# Patient Record
Sex: FEMALE | Race: WHITE | NOT HISPANIC OR LATINO | Employment: OTHER | URBAN - METROPOLITAN AREA
[De-identification: names, ages, dates, MRNs, and addresses within clinical notes are randomized per-mention and may not be internally consistent; named-entity substitution may affect disease eponyms.]

---

## 2017-02-07 RX ORDER — LISINOPRIL 20 MG/1
TABLET ORAL
Qty: 90 TABLET | Refills: 0 | Status: SHIPPED | OUTPATIENT
Start: 2017-02-07 | End: 2017-07-12 | Stop reason: SDUPTHER

## 2017-04-18 RX ORDER — LISINOPRIL 20 MG/1
TABLET ORAL
Qty: 90 TABLET | OUTPATIENT
Start: 2017-04-18

## 2017-07-12 ENCOUNTER — OFFICE VISIT (OUTPATIENT)
Dept: FAMILY MEDICINE CLINIC | Facility: CLINIC | Age: 47
End: 2017-07-12

## 2017-07-12 VITALS
SYSTOLIC BLOOD PRESSURE: 114 MMHG | WEIGHT: 198.4 LBS | DIASTOLIC BLOOD PRESSURE: 80 MMHG | HEIGHT: 63 IN | BODY MASS INDEX: 35.15 KG/M2 | TEMPERATURE: 98.3 F | OXYGEN SATURATION: 95 % | RESPIRATION RATE: 16 BRPM | HEART RATE: 101 BPM

## 2017-07-12 DIAGNOSIS — G43.009 MIGRAINE WITHOUT AURA AND WITHOUT STATUS MIGRAINOSUS, NOT INTRACTABLE: Primary | ICD-10-CM

## 2017-07-12 PROCEDURE — 99214 OFFICE O/P EST MOD 30 MIN: CPT | Performed by: INTERNAL MEDICINE

## 2017-07-12 RX ORDER — TOPIRAMATE 50 MG/1
50 TABLET, FILM COATED ORAL 2 TIMES DAILY
Qty: 30 TABLET | Refills: 5 | Status: SHIPPED | OUTPATIENT
Start: 2017-07-12 | End: 2022-06-22

## 2017-07-12 RX ORDER — SUMATRIPTAN 50 MG/1
TABLET, FILM COATED ORAL
Qty: 18 TABLET | Refills: 5 | Status: SHIPPED | OUTPATIENT
Start: 2017-07-12 | End: 2022-06-22

## 2017-07-12 RX ORDER — LISINOPRIL 20 MG/1
20 TABLET ORAL DAILY
Qty: 90 TABLET | Refills: 3 | Status: SHIPPED | OUTPATIENT
Start: 2017-07-12 | End: 2017-07-17 | Stop reason: SDUPTHER

## 2017-07-12 RX ORDER — ONDANSETRON 4 MG/1
4 TABLET, FILM COATED ORAL EVERY 8 HOURS PRN
Qty: 21 TABLET | Refills: 0 | Status: ON HOLD | OUTPATIENT
Start: 2017-07-12 | End: 2020-06-18

## 2017-07-12 NOTE — PROGRESS NOTES
"Subjective   Patient ID: Silvia Guillory is a 47 y.o. female presents with   Chief Complaint   Patient presents with   • Hypertension     f/u   • Follow-up     migraines       HPI - This patient presents today for follow-up of hypertension.  She is also having migraines.  Has about 2 weeks.  They can get severe enough to cause vomiting.  She's had an intermittent lifelong history of severe migraines.  We talked about potential triggers and avoidances.  She doesn't think she has sleep apnea.  She has had a triptan in the past and that helped with her headaches she like to be prescribed another one again.    Assessment plan    Migraine-we discussed the potential risks and benefits currently the migraines severely impairing her life.  Sumatriptan 50 mg 1 by mouth daily when necessary.  Will also start Topamax 50 mg twice daily.  Zofran when necessary for nausea    Hypertension controlled with lisinopril.        Allergies   Allergen Reactions   • Penicillins        The following portions of the patient's history were reviewed and updated as appropriate: allergies, current medications, past family history, past medical history, past social history, past surgical history and problem list.      Review of Systems   Constitutional: Negative.    Respiratory: Negative.    Cardiovascular: Negative.    Gastrointestinal: Positive for nausea and vomiting.   Neurological: Positive for headaches.   Psychiatric/Behavioral: Negative.         Bipolar disorder under control       Objective     Vitals:    07/12/17 1454   BP: 114/80   Pulse: 101   Resp: 16   Temp: 98.3 °F (36.8 °C)   TempSrc: Oral   SpO2: 95%   Weight: 198 lb 6.4 oz (90 kg)   Height: 63\" (160 cm)         Physical Exam   Constitutional: She is oriented to person, place, and time. She appears well-developed and well-nourished.   HENT:   Head: Normocephalic and atraumatic.   Cardiovascular: Normal rate, regular rhythm and normal heart sounds.    Pulmonary/Chest: Effort normal " and breath sounds normal.   Neurological: She is alert and oriented to person, place, and time.   Psychiatric: She has a normal mood and affect. Her behavior is normal.   Nursing note and vitals reviewed.        Silvia was seen today for hypertension and follow-up.    Diagnoses and all orders for this visit:    Migraine without aura and without status migrainosus, not intractable    Other orders  -     topiramate (TOPAMAX) 50 MG tablet; Take 1 tablet by mouth 2 (Two) Times a Day.  -     SUMAtriptan (IMITREX) 50 MG tablet; Take one tablet at onset of headache. May repeat dose one time in 2 hours if headache not relieved.  -     ondansetron (ZOFRAN) 4 MG tablet; Take 1 tablet by mouth Every 8 (Eight) Hours As Needed for Nausea or Vomiting.  -     lisinopril (PRINIVIL,ZESTRIL) 20 MG tablet; Take 1 tablet by mouth Daily.        Call or return to clinic prn if these symptoms worsen or fail to improve as anticipated.

## 2017-07-17 RX ORDER — LISINOPRIL 20 MG/1
20 TABLET ORAL DAILY
Qty: 90 TABLET | Refills: 3 | Status: SHIPPED | OUTPATIENT
Start: 2017-07-17 | End: 2020-06-17

## 2017-08-23 ENCOUNTER — TELEPHONE (OUTPATIENT)
Dept: FAMILY MEDICINE CLINIC | Facility: CLINIC | Age: 47
End: 2017-08-23

## 2017-08-23 NOTE — TELEPHONE ENCOUNTER
Silvia was at the park on Sunday and thinks she was stung by a sweat bee. She is allergic to bees. She said that the spot is red,swollen, and pus filled. What should she do? Can you send something in or does she need to be seen?

## 2017-08-23 NOTE — TELEPHONE ENCOUNTER
Use hot compresses topical antibiotic like Neosporin and zyrtec if not getting better come see me later on this week or early next week.

## 2017-08-24 ENCOUNTER — TELEPHONE (OUTPATIENT)
Dept: FAMILY MEDICINE CLINIC | Facility: CLINIC | Age: 47
End: 2017-08-24

## 2017-08-24 RX ORDER — EPINEPHRINE 0.3 MG/.3ML
0.3 INJECTION SUBCUTANEOUS ONCE
Qty: 1 EACH | Refills: 0 | Status: SHIPPED | OUTPATIENT
Start: 2017-08-24 | End: 2017-08-24

## 2020-06-17 ENCOUNTER — HOSPITAL ENCOUNTER (EMERGENCY)
Facility: HOSPITAL | Age: 50
Discharge: PSYCHIATRIC OR SUBSTANCE USE FACILITY | End: 2020-06-18
Attending: EMERGENCY MEDICINE

## 2020-06-17 VITALS
HEART RATE: 102 BPM | WEIGHT: 203.7 LBS | SYSTOLIC BLOOD PRESSURE: 145 MMHG | TEMPERATURE: 100.2 F | RESPIRATION RATE: 16 BRPM | BODY MASS INDEX: 36.09 KG/M2 | OXYGEN SATURATION: 99 % | HEIGHT: 63 IN | DIASTOLIC BLOOD PRESSURE: 88 MMHG

## 2020-06-17 DIAGNOSIS — F31.9 BIPOLAR 1 DISORDER (HCC): ICD-10-CM

## 2020-06-17 DIAGNOSIS — F32.A DEPRESSION, UNSPECIFIED DEPRESSION TYPE: ICD-10-CM

## 2020-06-17 DIAGNOSIS — R45.851 SUICIDAL IDEATION: Primary | ICD-10-CM

## 2020-06-17 LAB
AMPHET+METHAMPHET UR QL: NEGATIVE
BARBITURATES UR QL SCN: NEGATIVE
BENZODIAZ UR QL SCN: NEGATIVE
CANNABINOIDS SERPL QL: NEGATIVE
COCAINE UR QL: NEGATIVE
ETHANOL BLD-MCNC: 36 MG/DL (ref 0–10)
ETHANOL UR QL: 0.04 %
HCG SERPL QL: NEGATIVE
HOLD SPECIMEN: NORMAL
METHADONE UR QL SCN: NEGATIVE
OPIATES UR QL: NEGATIVE
OXYCODONE UR QL SCN: NEGATIVE
WHOLE BLOOD HOLD SPECIMEN: NORMAL
WHOLE BLOOD HOLD SPECIMEN: NORMAL

## 2020-06-17 PROCEDURE — 80307 DRUG TEST PRSMV CHEM ANLYZR: CPT | Performed by: EMERGENCY MEDICINE

## 2020-06-17 PROCEDURE — 84703 CHORIONIC GONADOTROPIN ASSAY: CPT | Performed by: EMERGENCY MEDICINE

## 2020-06-17 PROCEDURE — 85025 COMPLETE CBC W/AUTO DIFF WBC: CPT | Performed by: PSYCHIATRY & NEUROLOGY

## 2020-06-17 PROCEDURE — 80053 COMPREHEN METABOLIC PANEL: CPT | Performed by: PSYCHIATRY & NEUROLOGY

## 2020-06-17 PROCEDURE — 80061 LIPID PANEL: CPT | Performed by: PSYCHIATRY & NEUROLOGY

## 2020-06-17 PROCEDURE — 84443 ASSAY THYROID STIM HORMONE: CPT | Performed by: PSYCHIATRY & NEUROLOGY

## 2020-06-17 RX ORDER — LISINOPRIL 10 MG/1
10 TABLET ORAL NIGHTLY
COMMUNITY

## 2020-06-17 RX ORDER — ACETAMINOPHEN, ASPIRIN AND CAFFEINE 250; 250; 65 MG/1; MG/1; MG/1
2 TABLET, FILM COATED ORAL EVERY 6 HOURS PRN
COMMUNITY
End: 2022-06-22

## 2020-06-17 RX ORDER — ROPINIROLE 0.25 MG/1
0.5 TABLET, FILM COATED ORAL NIGHTLY
COMMUNITY

## 2020-06-17 RX ORDER — LURASIDONE HYDROCHLORIDE 40 MG/1
40 TABLET, FILM COATED ORAL EVERY EVENING
Status: ON HOLD | COMMUNITY
End: 2020-06-18

## 2020-06-17 RX ORDER — OMEPRAZOLE 20 MG/1
20 CAPSULE, DELAYED RELEASE ORAL NIGHTLY
COMMUNITY

## 2020-06-17 NOTE — ED NOTES
"Pt stopped taking antidepression meds in March d/t COVID 19 isolation and feeling sick when she took it.  Pt went to [place] for vacation and after returning has felt stronger feelings of depression.  Does not have a plan but is researching methods of suicide, told her  jose armando that she felt that \"she couldn't go on much longer\" and he brought her here. Pt wearing mask, this RN wearing mask and eye cover.     Monica Storey, TINY  06/17/20 1941       Monica Storey, RN  06/17/20 2202    "

## 2020-06-17 NOTE — ED TRIAGE NOTES
Pt arrives complaining of SI. States that she has not been on her psych meds for several months. Reports that she has not attempted to hurt herself today or in the last week. Did not want to say if she has tried to hurt herself beyond that.  Pt masked at arrival and triage staff wore all appropriate PPE.

## 2020-06-17 NOTE — ED PROVIDER NOTES
Subjective   50-year-old female with past medical history of depression, hypertension, previous suicide attempt here for chief complaint of suicidal ideation.  History was obtained from the patient.  The patient states that there is a lot going on because of her kids in her life.  She states that she has had thoughts of hurting self.  She states that her plan is to take pills.  She denies any homicidal ideations.  She denies any hallucinations.  Patient states that she has tried to commit suicide previously.  She states that she also has history of bipolar and her sister is also tried to commit suicide twice.  Patient denies any headaches, neck pain, sore throat, runny nose, cough, cold, chest pain, shortness of breath, abdominal pain, nausea, vomiting, diarrhea, fevers, chills, rashes, urinary symptoms, or any other symptoms. Of note, the patient states that because of COVID she stopped taking her medications and she feels like her medications were not working.          Review of Systems   All other systems reviewed and are negative.      Past Medical History:   Diagnosis Date   • Depression    • Hypertension        Allergies   Allergen Reactions   • Penicillins        Past Surgical History:   Procedure Laterality Date   • ADENOIDECTOMY     •  SECTION     • INNER EAR SURGERY     • TONSILLECTOMY         Family History   Problem Relation Age of Onset   • Heart disease Mother    • Cancer Father         prostate colon    • COPD Father        Social History     Socioeconomic History   • Marital status:      Spouse name: Not on file   • Number of children: Not on file   • Years of education: Not on file   • Highest education level: Not on file   Tobacco Use   • Smoking status: Never Smoker   Substance and Sexual Activity   • Alcohol use: No   • Drug use: No           Objective   Physical Exam   Constitutional: She is oriented to person, place, and time. She appears well-developed and well-nourished. No  distress.   HENT:   Head: Normocephalic and atraumatic.   Right Ear: External ear normal.   Left Ear: External ear normal.   Nose: Nose normal.   Mouth/Throat: Oropharynx is clear and moist. No oropharyngeal exudate.   Eyes: Pupils are equal, round, and reactive to light. Conjunctivae and EOM are normal. Right eye exhibits no discharge. Left eye exhibits no discharge. No scleral icterus.   Neck: Normal range of motion. Neck supple. No tracheal deviation present.   Cardiovascular: Normal rate, regular rhythm, normal heart sounds and intact distal pulses. Exam reveals no gallop and no friction rub.   No murmur heard.  Pulmonary/Chest: Effort normal and breath sounds normal. No stridor. No respiratory distress. She has no wheezes. She has no rales. She exhibits no tenderness.   Abdominal: Soft. Bowel sounds are normal. She exhibits no distension. There is no tenderness. There is no rebound and no guarding.   Musculoskeletal: Normal range of motion. She exhibits no edema, tenderness or deformity.   Neurological: She is alert and oriented to person, place, and time. No sensory deficit. She exhibits normal muscle tone.   Skin: Skin is warm and dry. Capillary refill takes less than 2 seconds. She is not diaphoretic. No erythema. No pallor.   Psychiatric:   Tearful   Vitals reviewed.      Procedures           ED Course  ED Course as of Jul 20 1201 Wed Jun 17, 2020 2000 Care assumed from Dr. Ball pending access evaluation.    [EE]   2214 Discussed with [facility], from [facility].  He has interviewed pt.  Plan for likely admission.      [EE]   2232 [facility] has discussed with Dr. Abdalla.  [facility] will [facility].    [EE]   2232 I have discussed this with the patient.  She is in agreement.    [EE]      ED Course User Index  [EE] Jaime Yost PA                                           MDM  Number of Diagnoses or Management Options     Amount and/or Complexity of Data Reviewed  Clinical lab tests: ordered    Risk  of Complications, Morbidity, and/or Mortality  General comments: When I first saw the patient, the patient appeared nontoxic.  Patient was stable.  Patient had no medical complaints.  Patient was medically cleared.  Patient is here for suicidal ideations.  We did get a beta-hCG, ethanol level, and UDS.  This is currently pending.  We did consult access given that patient is suicidal and has a plan.  Additionally, she does have history of bipolar and has attempted to commit suicide previously.  Additionally, patient is also not taking her medications like she supposed to.  Patient was placed on a 72-hour hold.  At this point, I defer all further management of this patient to the psych team and further management of the patient and disposition depends on the psych consultation.        Final diagnoses:   Suicidal ideation   Bipolar 1 disorder (CMS/[facility])   Depression, unspecified depression type            Clint Ball MD  06/17/20 1951       Clint Ball MD  07/20/20 1201

## 2020-06-18 ENCOUNTER — HOSPITAL ENCOUNTER (INPATIENT)
Facility: HOSPITAL | Age: 50
LOS: 4 days | Discharge: HOME | End: 2020-06-22
Attending: SPECIALIST | Admitting: SPECIALIST

## 2020-06-18 DIAGNOSIS — R06.83 SNORING: Primary | ICD-10-CM

## 2020-06-18 DIAGNOSIS — G25.81 RESTLESS LEG: ICD-10-CM

## 2020-06-18 PROBLEM — E87.20 METABOLIC ACIDOSIS: Status: ACTIVE | Noted: 2020-06-18

## 2020-06-18 PROBLEM — R63.5 WEIGHT GAIN: Status: ACTIVE | Noted: 2020-06-18

## 2020-06-18 PROBLEM — R79.89 ABNORMAL TSH: Status: ACTIVE | Noted: 2020-06-18

## 2020-06-18 PROBLEM — I10 HYPERTENSION: Status: ACTIVE | Noted: 2020-06-18

## 2020-06-18 PROBLEM — F33.2 MAJOR DEPRESSIVE DISORDER, RECURRENT SEVERE WITHOUT PSYCHOTIC FEATURES: Status: ACTIVE | Noted: 2020-06-18

## 2020-06-18 PROBLEM — G43.909 MIGRAINE: Status: ACTIVE | Noted: 2020-06-18

## 2020-06-18 LAB
ALBUMIN SERPL-MCNC: 3.9 G/DL (ref 3.5–5.2)
ALBUMIN/GLOB SERPL: 1.3 G/DL
ALP SERPL-CCNC: 74 U/L (ref 39–117)
ALT SERPL W P-5'-P-CCNC: 25 U/L (ref 1–33)
ANION GAP SERPL CALCULATED.3IONS-SCNC: 10.9 MMOL/L (ref 5–15)
ANION GAP SERPL CALCULATED.3IONS-SCNC: 15.5 MMOL/L (ref 5–15)
AST SERPL-CCNC: 20 U/L (ref 1–32)
BASOPHILS # BLD AUTO: 0.04 10*3/MM3 (ref 0–0.2)
BASOPHILS NFR BLD AUTO: 0.4 % (ref 0–1.5)
BILIRUB SERPL-MCNC: 0.2 MG/DL (ref 0.2–1.2)
BUN BLD-MCNC: 12 MG/DL (ref 6–20)
BUN BLD-MCNC: 12 MG/DL (ref 6–20)
BUN/CREAT SERPL: 13.8 (ref 7–25)
BUN/CREAT SERPL: 15.2 (ref 7–25)
CALCIUM SPEC-SCNC: 9.1 MG/DL (ref 8.6–10.5)
CALCIUM SPEC-SCNC: 9.3 MG/DL (ref 8.6–10.5)
CHLORIDE SERPL-SCNC: 108 MMOL/L (ref 98–107)
CHLORIDE SERPL-SCNC: 110 MMOL/L (ref 98–107)
CHOLEST SERPL-MCNC: 168 MG/DL (ref 0–200)
CO2 SERPL-SCNC: 14.5 MMOL/L (ref 22–29)
CO2 SERPL-SCNC: 20.1 MMOL/L (ref 22–29)
CREAT BLD-MCNC: 0.79 MG/DL (ref 0.57–1)
CREAT BLD-MCNC: 0.87 MG/DL (ref 0.57–1)
DEPRECATED RDW RBC AUTO: 46.5 FL (ref 37–54)
EOSINOPHIL # BLD AUTO: 0.11 10*3/MM3 (ref 0–0.4)
EOSINOPHIL NFR BLD AUTO: 1.2 % (ref 0.3–6.2)
ERYTHROCYTE [DISTWIDTH] IN BLOOD BY AUTOMATED COUNT: 13.7 % (ref 12.3–15.4)
GFR SERPL CREATININE-BSD FRML MDRD: 69 ML/MIN/1.73
GFR SERPL CREATININE-BSD FRML MDRD: 78 ML/MIN/1.73
GLOBULIN UR ELPH-MCNC: 3.1 GM/DL
GLUCOSE BLD-MCNC: 114 MG/DL (ref 65–99)
GLUCOSE BLD-MCNC: 141 MG/DL (ref 65–99)
HCT VFR BLD AUTO: 36.9 % (ref 34–46.6)
HDLC SERPL-MCNC: 46 MG/DL (ref 40–60)
HGB BLD-MCNC: 12.1 G/DL (ref 12–15.9)
IMM GRANULOCYTES # BLD AUTO: 0.06 10*3/MM3 (ref 0–0.05)
IMM GRANULOCYTES NFR BLD AUTO: 0.7 % (ref 0–0.5)
LDLC SERPL CALC-MCNC: 90 MG/DL (ref 0–100)
LDLC/HDLC SERPL: 1.95 {RATIO}
LYMPHOCYTES # BLD AUTO: 2.06 10*3/MM3 (ref 0.7–3.1)
LYMPHOCYTES NFR BLD AUTO: 22.5 % (ref 19.6–45.3)
MCH RBC QN AUTO: 29.9 PG (ref 26.6–33)
MCHC RBC AUTO-ENTMCNC: 32.8 G/DL (ref 31.5–35.7)
MCV RBC AUTO: 91.1 FL (ref 79–97)
MONOCYTES # BLD AUTO: 0.58 10*3/MM3 (ref 0.1–0.9)
MONOCYTES NFR BLD AUTO: 6.3 % (ref 5–12)
NEUTROPHILS # BLD AUTO: 6.32 10*3/MM3 (ref 1.7–7)
NEUTROPHILS NFR BLD AUTO: 68.9 % (ref 42.7–76)
NRBC BLD AUTO-RTO: 0 /100 WBC (ref 0–0.2)
PLATELET # BLD AUTO: 254 10*3/MM3 (ref 140–450)
PMV BLD AUTO: 10.6 FL (ref 6–12)
POTASSIUM BLD-SCNC: 3.8 MMOL/L (ref 3.5–5.2)
POTASSIUM BLD-SCNC: 4.4 MMOL/L (ref 3.5–5.2)
PROT SERPL-MCNC: 7 G/DL (ref 6–8.5)
RBC # BLD AUTO: 4.05 10*6/MM3 (ref 3.77–5.28)
SODIUM BLD-SCNC: 138 MMOL/L (ref 136–145)
SODIUM BLD-SCNC: 141 MMOL/L (ref 136–145)
T4 FREE SERPL-MCNC: 1.2 NG/DL (ref 0.93–1.7)
TRIGL SERPL-MCNC: 161 MG/DL (ref 0–150)
TSH SERPL DL<=0.05 MIU/L-ACNC: 6.19 UIU/ML (ref 0.27–4.2)
TSH SERPL DL<=0.05 MIU/L-ACNC: 9.2 UIU/ML (ref 0.27–4.2)
VLDLC SERPL-MCNC: 32.2 MG/DL (ref 5–40)
WBC NRBC COR # BLD: 9.17 10*3/MM3 (ref 3.4–10.8)

## 2020-06-18 PROCEDURE — 84443 ASSAY THYROID STIM HORMONE: CPT | Performed by: NURSE PRACTITIONER

## 2020-06-18 PROCEDURE — 84439 ASSAY OF FREE THYROXINE: CPT | Performed by: NURSE PRACTITIONER

## 2020-06-18 PROCEDURE — 80048 BASIC METABOLIC PNL TOTAL CA: CPT | Performed by: NURSE PRACTITIONER

## 2020-06-18 RX ORDER — SUMATRIPTAN 50 MG/1
50 TABLET, FILM COATED ORAL
Status: DISCONTINUED | OUTPATIENT
Start: 2020-06-18 | End: 2020-06-22 | Stop reason: HOSPADM

## 2020-06-18 RX ORDER — PANTOPRAZOLE SODIUM 40 MG/1
40 TABLET, DELAYED RELEASE ORAL NIGHTLY
Status: DISCONTINUED | OUTPATIENT
Start: 2020-06-18 | End: 2020-06-20

## 2020-06-18 RX ORDER — ROPINIROLE 0.5 MG/1
0.5 TABLET, FILM COATED ORAL NIGHTLY
Status: DISCONTINUED | OUTPATIENT
Start: 2020-06-18 | End: 2020-06-22 | Stop reason: HOSPADM

## 2020-06-18 RX ORDER — LURASIDONE HYDROCHLORIDE 20 MG/1
20 TABLET, FILM COATED ORAL NIGHTLY
Status: DISCONTINUED | OUTPATIENT
Start: 2020-06-18 | End: 2020-06-18

## 2020-06-18 RX ORDER — ACETAMINOPHEN 325 MG/1
650 TABLET ORAL EVERY 4 HOURS PRN
Status: DISCONTINUED | OUTPATIENT
Start: 2020-06-18 | End: 2020-06-22 | Stop reason: HOSPADM

## 2020-06-18 RX ORDER — LOPERAMIDE HYDROCHLORIDE 2 MG/1
2 CAPSULE ORAL
Status: DISCONTINUED | OUTPATIENT
Start: 2020-06-18 | End: 2020-06-22 | Stop reason: HOSPADM

## 2020-06-18 RX ORDER — TOPIRAMATE 50 MG/1
50 TABLET, FILM COATED ORAL 2 TIMES DAILY
Status: DISCONTINUED | OUTPATIENT
Start: 2020-06-18 | End: 2020-06-22 | Stop reason: HOSPADM

## 2020-06-18 RX ORDER — LISINOPRIL 10 MG/1
10 TABLET ORAL NIGHTLY
Status: DISCONTINUED | OUTPATIENT
Start: 2020-06-18 | End: 2020-06-22 | Stop reason: HOSPADM

## 2020-06-18 RX ORDER — ACETAMINOPHEN, ASPIRIN AND CAFFEINE 250; 250; 65 MG/1; MG/1; MG/1
2 TABLET, FILM COATED ORAL EVERY 12 HOURS PRN
Status: DISCONTINUED | OUTPATIENT
Start: 2020-06-18 | End: 2020-06-22 | Stop reason: HOSPADM

## 2020-06-18 RX ORDER — CLONAZEPAM 0.5 MG/1
0.5 TABLET ORAL 2 TIMES DAILY PRN
Status: DISCONTINUED | OUTPATIENT
Start: 2020-06-18 | End: 2020-06-22 | Stop reason: HOSPADM

## 2020-06-18 RX ORDER — LURASIDONE HYDROCHLORIDE 40 MG/1
40 TABLET, FILM COATED ORAL NIGHTLY
Status: DISCONTINUED | OUTPATIENT
Start: 2020-06-18 | End: 2020-06-18

## 2020-06-18 RX ORDER — ALUMINA, MAGNESIA, AND SIMETHICONE 2400; 2400; 240 MG/30ML; MG/30ML; MG/30ML
15 SUSPENSION ORAL EVERY 6 HOURS PRN
Status: DISCONTINUED | OUTPATIENT
Start: 2020-06-18 | End: 2020-06-22 | Stop reason: HOSPADM

## 2020-06-18 RX ORDER — LURASIDONE HYDROCHLORIDE 40 MG/1
20 TABLET, FILM COATED ORAL DAILY
Status: DISCONTINUED | OUTPATIENT
Start: 2020-06-18 | End: 2020-06-22 | Stop reason: HOSPADM

## 2020-06-18 RX ORDER — DESVENLAFAXINE SUCCINATE 50 MG/1
50 TABLET, EXTENDED RELEASE ORAL DAILY
Status: DISCONTINUED | OUTPATIENT
Start: 2020-06-18 | End: 2020-06-22 | Stop reason: HOSPADM

## 2020-06-18 RX ORDER — DESVENLAFAXINE 25 MG/1
25 TABLET, EXTENDED RELEASE ORAL DAILY
Status: DISCONTINUED | OUTPATIENT
Start: 2020-06-18 | End: 2020-06-18

## 2020-06-18 RX ADMIN — TOPIRAMATE 50 MG: 50 TABLET, FILM COATED ORAL at 20:58

## 2020-06-18 RX ADMIN — ROPINIROLE HYDROCHLORIDE 0.5 MG: 0.5 TABLET, FILM COATED ORAL at 01:29

## 2020-06-18 RX ADMIN — PANTOPRAZOLE SODIUM 40 MG: 40 TABLET, DELAYED RELEASE ORAL at 01:30

## 2020-06-18 RX ADMIN — TOPIRAMATE 50 MG: 50 TABLET, FILM COATED ORAL at 10:19

## 2020-06-18 RX ADMIN — LURASIDONE HYDROCHLORIDE 20 MG: 20 TABLET, FILM COATED ORAL at 19:29

## 2020-06-18 RX ADMIN — DESVENLAFAXINE SUCCINATE 50 MG: 50 TABLET, EXTENDED RELEASE ORAL at 16:08

## 2020-06-18 RX ADMIN — CLONAZEPAM 0.5 MG: 0.5 TABLET ORAL at 08:53

## 2020-06-18 RX ADMIN — LISINOPRIL 10 MG: 10 TABLET ORAL at 20:58

## 2020-06-18 RX ADMIN — LURASIDONE HYDROCHLORIDE 40 MG: 40 TABLET, FILM COATED ORAL at 01:28

## 2020-06-18 RX ADMIN — TOPIRAMATE 50 MG: 50 TABLET, FILM COATED ORAL at 01:31

## 2020-06-18 RX ADMIN — CLONAZEPAM 0.5 MG: 0.5 TABLET ORAL at 21:04

## 2020-06-18 RX ADMIN — ROPINIROLE HYDROCHLORIDE 0.5 MG: 0.5 TABLET, FILM COATED ORAL at 20:58

## 2020-06-18 RX ADMIN — LISINOPRIL 10 MG: 10 TABLET ORAL at 01:28

## 2020-06-18 RX ADMIN — PANTOPRAZOLE SODIUM 40 MG: 40 TABLET, DELAYED RELEASE ORAL at 20:58

## 2020-06-18 NOTE — PROGRESS NOTES
Continued Stay Note  Psychiatric     Patient Name: Silvia Guillory  MRN: [id number]  Today's Date: 6/18/2020    Admit Date: 6/18/2020    Discharge Plan     Row Name 06/18/20 1711       Plan    Plan Comments  SW called and spoke w/pt's spouse, Seymour Guillory, ph. [phone] to discuss pt's tx & d/c planning.  SW explained role of CCP related to d/c planning.  Pt's spouse stated that he has been able to speak w/her several times and just wants her to get better.  SW adv that she would keep him updated on pt's d/c plans.    Row Name 06/18/20 1703       Plan    Plan   Pt will return home.  Pt will participate in therapeutic groups/activities on the unit.  SW will explore outpt providers for continuity of care.    Plan Comments  SW met w/pt to discuss tx & d/c planning.  Pt was able to verify demographic info as well as PCP, Daniel Monaco.   SW verified emergency contacts & confirmed that her spouse could be contacted.  SW inquired about outpt mental health providers; pt stated that she sees Dr. Calderon Bañuelos in [place], [place].  Pt stated that she use to see Reyna Benoit at [facility] and would like to resume svcs w/her.        Discharge Codes    No documentation.             DOMINGUEZ Hurst

## 2020-06-18 NOTE — CONSULTS
Consultation     Patient Name: Silvia Guillory  Age/Sex: 50 y.o. female  : 1970  MRN: [id number]    Date of Admission: 2020  Date of Encounter Visit: 20  Encounter Provider: RACHAEL Blakely  Place of Service: [facility]  Patient Care Team:  Saroj Frederick MD as PCP - General (Internal Medicine)    Subjective:     Consults  Reason for consultation: Medical evaluation contributing to current psychiatric illness.    Chief Complaint:   depression    History of Present Illness  Silvia Guillory is a 50 y.o. female who was admitted to the Crisis Management Unit for further evaluation regarding worsening depression.  We were asked to see and evaluate her medical issues as they may pertain to her current psychiatric illness. Patient has a history of depression and HTN. Denies any recent illnesses, chest pain, palpitations, dizziness, N/V/D, edema or shortness of breath.  She reports a chronic history of depression and has been on medications for well over 15 years.  In February she decided to stop taking her medication because she felt it was not helping and seemed to make her symptoms worse.      She has been having persistent issues with sleeping and has been told that she snores pretty loudly.  She has chronic fatigue and often crashes and then has intermittent awakenings throughout the night.  Denies any prior evaluation for sleep apnea.  Over the past year she has had about a 30 to 50 pound weight gain despite adjustments in diet.  Recently she started the keto diet and has been able to lose some weight, but is having increased issues with constipation.  Denies any personal or family history of thyroid disease.  She reports increased family stressors especially with her grown children.  Recently she was interested in suicide methods and was encouraged by her psychiatrist to consider inpatient treatment with medication adjustment.  She has a history of migraines and has been on  Topamax for quite some time.     Labs showed CO2 14.5 with anion gap of 15.5, chloride 108, TSH 9.2 and triglycerides 161.  Urine tox screen was negative, but was positive for ethanol.     Review of Systems   Constitutional: Positive for fatigue. Negative for chills and fever.   HENT: Negative for congestion and trouble swallowing.    Eyes: Negative for visual disturbance.   Respiratory: Negative for cough, shortness of breath and wheezing.    Cardiovascular: Negative for chest pain, palpitations and leg swelling.   Gastrointestinal: Positive for constipation. Negative for abdominal pain, diarrhea, nausea and vomiting.   Endocrine: Negative for cold intolerance, heat intolerance, polydipsia and polyuria.   Genitourinary: Negative for difficulty urinating and dysuria.   Musculoskeletal: Negative for back pain.   Neurological: Positive for headaches. Negative for dizziness, syncope and weakness.   Psychiatric/Behavioral: Positive for dysphoric mood and sleep disturbance. Negative for confusion. The patient is not nervous/anxious.    All other systems reviewed and are negative.      Past Medical and Surgical History:  Past Medical History:   Diagnosis Date   • Depression    • Hypertension        Past Surgical History:   Procedure Laterality Date   • ADENOIDECTOMY     •  SECTION     • INNER EAR SURGERY     • TONSILLECTOMY         Home Medications:   Medications Prior to Admission   Medication Sig Dispense Refill Last Dose   • aspirin-acetaminophen-caffeine (EXCEDRIN MIGRAINE) 250-250-65 MG per tablet Take 2 tablets by mouth Every 6 (Six) Hours As Needed for Headache.   Past Month at Unknown time   • clonazePAM (KlonoPIN) 0.5 MG tablet Take 1 tablet by mouth 2 (two) times a day as needed.   2020 at Unknown time   • lisinopril (PRINIVIL,ZESTRIL) 10 MG tablet Take 10 mg by mouth Every Night.   2020 at Unknown time   • omeprazole (priLOSEC) 20 MG capsule Take 20 mg by mouth Every Night.   2020 at  Unknown time   • rOPINIRole (REQUIP) 0.25 MG tablet Take 0.5 mg by mouth Every Night. Take 1 hour before bedtime, take 2 tabs nightly   6/16/2020 at Unknown time   • SUMAtriptan (IMITREX) 50 MG tablet Take one tablet at onset of headache. May repeat dose one time in 2 hours if headache not relieved. 18 tablet 5 Past Month at Unknown time   • topiramate (TOPAMAX) 50 MG tablet Take 1 tablet by mouth 2 (Two) Times a Day. (Patient taking differently: Take 50 mg by mouth 2 (Two) Times a Day. Takes 1-2 tabs ( mg) nightly, for migraines) 30 tablet 5 6/16/2020 at Unknown time   • buPROPion XL (WELLBUTRIN XL) 300 MG 24 hr tablet Take 300 mg by mouth Daily.   Unknown at Unknown time       Inpatient Medications:  Scheduled Meds:  Desvenlafaxine Succinate ER 50 mg Oral Daily   lisinopril 10 mg Oral Nightly   Lurasidone HCl 20 mg Oral Daily   pantoprazole 40 mg Oral Nightly   rOPINIRole 0.5 mg Oral Nightly   topiramate 50 mg Oral BID     Continuous Infusions:   PRN Meds:.•  acetaminophen  •  aluminum-magnesium hydroxide-simethicone  •  aspirin-acetaminophen-caffeine  •  clonazePAM  •  loperamide  •  magnesium hydroxide  •  SUMAtriptan    Allergies:  Allergies   Allergen Reactions   • Penicillins    • Iodides Hives       Past Social History:  Social History     Socioeconomic History   • Marital status:      Spouse name: Not on file   • Number of children: Not on file   • Years of education: Not on file   • Highest education level: Not on file   Tobacco Use   • Smoking status: Never Smoker   Substance and Sexual Activity   • Alcohol use: No   • Drug use: No       Past Family History:  Family History   Problem Relation Age of Onset   • Heart disease Mother    • Cancer Father         prostate colon    • COPD Father        Objective:   Temp:  [97.5 °F (36.4 °C)-100.2 °F (37.9 °C)] 98 °F (36.7 °C)  Heart Rate:  [] 86  Resp:  [16-18] 16  BP: (121-145)/(80-88) 121/80   SpO2:  [98 %-100 %] 98 %  on    Device (Oxygen  Therapy): room air    Intake/Output Summary (Last 24 hours) at 6/18/2020 1639  Last data filed at 6/18/2020 0833  Gross per 24 hour   Intake 720 ml   Output --   Net 720 ml     There is no height or weight on file to calculate BMI.  There were no vitals filed for this visit.  Weight change:   Ventilator/Non-Invasive Ventilation Settings (From admission, onward)    None          Physical Exam   Constitutional: She is oriented to person, place, and time. She appears well-developed and well-nourished. No distress.   HENT:   Head: Normocephalic and atraumatic.   Eyes: Pupils are equal, round, and reactive to light. Conjunctivae are normal. No scleral icterus.   Neck: Neck supple. No tracheal deviation present. No thyromegaly present.   Cardiovascular: Normal rate, regular rhythm and normal heart sounds.   No murmur heard.  Pulmonary/Chest: Effort normal and breath sounds normal. She has no wheezes. She has no rales.   Abdominal: Soft. Bowel sounds are normal. She exhibits no distension. There is no tenderness.   Truncal obestiy   Musculoskeletal: She exhibits no edema.   Neurological: She is alert and oriented to person, place, and time.   Skin: Skin is warm and dry. She is not diaphoretic.   Psychiatric:   Flat affect   Nursing note and vitals reviewed.        Lab Review:     Results from last 7 days   Lab Units 06/17/20 1947   SODIUM mmol/L 138   POTASSIUM mmol/L 3.8   CHLORIDE mmol/L 108*   CO2 mmol/L 14.5*   BUN mg/dL 12   CREATININE mg/dL 0.79   EGFR IF NONAFRICN AM mL/min/1.73 78   GLUCOSE mg/dL 141*   CALCIUM mg/dL 9.3   AST (SGOT) U/L 20   ALT (SGPT) U/L 25     Estimated Creatinine Clearance: 94 mL/min (by C-G formula based on SCr of 0.79 mg/dL).          Results from last 7 days   Lab Units 06/17/20 1947   WBC 10*3/mm3 9.17   HEMOGLOBIN g/dL 12.1   HEMATOCRIT % 36.9   PLATELETS 10*3/mm3 254   MCV fL 91.1   MCH pg 29.9   MCHC g/dL 32.8   RDW % 13.7   RDW-SD fl 46.5   MPV fL 10.6   NEUTROPHIL % % 68.9    LYMPHOCYTE % % 22.5   MONOCYTES % % 6.3   EOSINOPHIL % % 1.2   BASOPHIL % % 0.4   IMM GRAN % % 0.7*   NEUTROS ABS 10*3/mm3 6.32   LYMPHS ABS 10*3/mm3 2.06   MONOS ABS 10*3/mm3 0.58   EOS ABS 10*3/mm3 0.11   BASOS ABS 10*3/mm3 0.04   IMMATURE GRANS (ABS) 10*3/mm3 0.06*   NRBC /100 WBC 0.0             Results from last 7 days   Lab Units 06/17/20 1947   CHOLESTEROL mg/dL 168   TRIGLYCERIDES mg/dL 161*   HDL CHOL mg/dL 46         Results from last 7 days   Lab Units 06/17/20 1947   TSH uIU/mL 9.200*                                       Imaging:  Imaging Results (Last 24 Hours)     ** No results found for the last 24 hours. **          EKG:  No orders to display       I reviewed the patient's new clinical results, lab tests and medications.     Problem List:     Active Hospital Problems    Diagnosis  POA   • Major depressive disorder, recurrent severe without psychotic features (CMS/HCC) [F33.2]  Yes   • Snoring [R06.83]  Unknown   • Abnormal TSH [R79.89]  Unknown   • Weight gain [R63.5]  Unknown   • Metabolic acidosis [E87.2]  Unknown   • Hypertension [I10]  Unknown   • Restless leg [G25.81]  Unknown   • Migraine [G43.909]  Unknown      Resolved Hospital Problems   No resolved problems to display.       Assessment and Plan:     ·   Major depressive disorder, recurrent severe without psychotic features (CMS/HCC)- defer management to psychiatry. With abnormal TSH there could be some component of hypothyroid that could be contributing as well as possible untreated sleep apnea affecting overall sleep.   ·   Snoring- recommend outpatient evaluation for sleep apnea   ·   Abnormal TSH- TSH 9 with prior normal level. She does have symptoms with fatigue, depression and constipation. Will check free T4 and if abnormal could consider trial of levothyroxine. If Free T4 is normal then may be from sick thyroid and would hold on treatment. Regardless will need repeat testing in 6 weeks with PCP.   ·   Weight gain- likely  multifactorial. Thyroid abnormality and sleep apnea may contribute. Continue diet modifications and exercise.   ·   Metabolic acidosis- possible from dehydration and keto diet. Repeat BMP and encouraged oral fluids.   ·   Hypertension- BP stable. Continue lisinopril  ·   Restless leg- chronic. Could be worsened by untreated sleep apnea. Continue requip. Sleep apnea eval as per above.   ·   Migraine- chronic. No acute exacerbation. Continue topamax and PRN imitrex      The patient seems medically stable at this time. If labs are stable will plan to sign off. Otherwise there are no medical issues which need to be addressed while she is under psychiatric care and may continue to follow up with her PCP as an outpatient and obtain sleep apnea evaluation.     RACHAEL Blakely  [facility]  06/18/20  4:39 PM    Dictated utilizing Dragon dictation

## 2020-06-18 NOTE — PLAN OF CARE
Problem: Patient Care Overview  Goal: Plan of Care Review  Outcome: Ongoing (interventions implemented as appropriate)  Goal: Individualization and Mutuality  Outcome: Ongoing (interventions implemented as appropriate)  Goal: Discharge Needs Assessment  Outcome: Ongoing (interventions implemented as appropriate)  Goal: Interprofessional Rounds/Family Conf  Outcome: Ongoing (interventions implemented as appropriate)     Problem: Overarching Goals (Adult)  Goal: Adheres to Safety Considerations for Self and Others  Outcome: Ongoing (interventions implemented as appropriate)  Goal: Optimized Coping Skills in Response to Life Stressors  Outcome: Ongoing (interventions implemented as appropriate)  Goal: Develops/Participates in Therapeutic [place] to Support Successful Transition  Outcome: Ongoing (interventions implemented as appropriate)     Problem: Suicidal Behavior (Adult)  Goal: Suicidal Behavior is Absent/Minimized/Managed  Outcome: Ongoing (interventions implemented as appropriate)  Flowsheets (Taken 6/17/2020 4363)  Suicidal Behavior Managed/Minimized Time Frame for Action Step (STG): 4 days  Suicidal Behavior Managed/Minimized Action Step (STG) Outcome: making progress toward outcome
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
## 2020-06-18 NOTE — PLAN OF CARE
Problem: Patient Care Overview  Goal: Individualization and Mutuality  Outcome: Ongoing (interventions implemented as appropriate)  Flowsheets (Taken 6/18/2020 1019)  Patient Personal Strengths: stable living environment; family/social support     Problem: Patient Care Overview  Goal: Interprofessional Rounds/Family Conf  Outcome: Ongoing (interventions implemented as appropriate)  Flowsheets (Taken 6/18/2020 1019)  Participants: art therapy; ; pharmacy; psychiatrist; social work; nursing  Summary: Treatment team met to discuss pt's plan of care.  Pt will participate in therapeutic groups/activities on the unit.  SW will explore outpt providers.  Progress & svcs needed will be assessed ongoing.     Problem: Suicidal Behavior (Adult)  Goal: Suicidal Behavior is Absent/Minimized/Managed  Outcome: Ongoing (interventions implemented as appropriate)  Flowsheets  Taken 6/18/2020 1019 by Lucinda Wolfe CSW  Suicidal Behavior Managed/Minimized Time Frame for Action Step (STG): 4 days  Taken 6/18/2020 0442 by Daisha Patterson RN  Suicidal Behavior Managed/Minimized Action Step (STG) Outcome: making progress toward outcome     Patient/Guardian Signature: __________________________________            Psychiatrist Signature: ______________________________________             Therapist Signature: ________________________________________         Nurse Signature: ___________________________________________          Staff Signature: ____________________________________________            Staff Signature: ____________________________________________          Staff Signature: ____________________________________________          Staff Signature:

## 2020-06-18 NOTE — CONSULTS
"Reports she has been seeing Dr. Calderon Bañuelos since June, 2015, and continued to see him in [place] when he moved his practice there. Educated about process.     Confirms that her  called [facility] 3 times to day with f/u questions about a likely admit (after, per day shift report) Dr. Bañuelos called [facility] and advised us that pt would be coming and did, in his professional opinion, meet criteria for an inpatient psych admit at that time.     Pt reports she was BIB , Seymour Guillory, [phone], provides verbal consent to share PHI with ; reports  still here in car.     Asked what her recent stressors have been, \"life, my kids\", vague, \"they just break your heart\", \"they first of all, they both got  [March, 2019] ... We're Jehovah witnesses ... Both of them have been ex-communicated ... The one has recently come back, but within a week of him coming back the other was excommunicated, he's gone off the deep end, he went and got a motorcycle, he needs to be medicated ... My boys need to be medicated ... And they refuse it\". Thinks youngest is bipolar, and \"it just scares me right now with him being gone ... He just doesn't care any more ... He just doesn't care period about anything\". Reports that \"I'm scared to death\" that younger son would hurt himself, the one who is now excommunicated and just turned 22. Accepts characterization of conflicting loyalties to her huber and to her younger son. Also reports that she had to put down in November, after 11 years. Tearful when discussing this, stating that Remus (dog) \"was always there for me\".     Confirms that she was researching suicide methods on the Internet, but states that was a few days ago. Reports that 's mom had to go into NH and isn't doing well,  also taking care of dad. States this is leading her to have guilt about her own crisis.     Asked what lead her to come in today, reports that she only told  " "about researching methods of suicide today, leading him to insist that she get help.     Reports last suicide attempt was by \"taking a few more pills than I should a few weeks ago, but seriously [suicide attempt was] maybe a year or 2\" ago, and first suicide attempt was after mom  in . Confirms 2 suicide attempts in lifetime on f/u, and that \"if someone hadn't come in, I would have succeeded both times\". Reports sister attempted suicide X 2, MRE 25 years ago.     Reports she is aware of effect her suicide would have on her family, \"I am, but I think they would all be fine, she's the only one that I really worry about\" - indicating the degree of thought she has recently put into suicide.     Reports therapist was last seen about 1-1 1/2 years ago, Reyna Benoit.     Reports 1-2 drinks a few times a month, reports she had a couple beers tonight. Denies illicit drug use.     Reports waking several times a night.     Educated about medications, voices reluctance to take any meds, especially any meds with a side effect of weight gain, reporting she has recently gained weight anyway.     Reports hx HTN, GERD, RLS, migraines, anxiety, bipolar, depression.     I called , who confirms pt's narrative, and reports that pt was \"she was so mad at me, because I wouldn't let her do what she needed to do to kill herself\".     Dr. Abdalla ordered inpatient admission to [facility], PA Elder agrees with plan.     Pt verbally shawn for safety while in hospital, and states she will tell staff prior to harm behaviors if unable to continue shawn for safety in the future. Appears calmer after eval, and sincere.     Dr. Abdalla ordered no 1:1 sitter if on [place] [facility], would require a 1:1 sitter if admitted or moved to [place] [facility].                             "

## 2020-06-18 NOTE — PLAN OF CARE
Problem: Patient Care Overview  Goal: Plan of Care Review  Outcome: Ongoing (interventions implemented as appropriate)  Flowsheets (Taken 6/18/2020 4752)  Progress: improving  Plan of Care Reviewed With: patient  Patient Agreement with Plan of Care: agrees  Note:   Pt arrived on the unit at around 0018, escorted by security. Pt was seeing in the emergency room for SI. Pt has a history of major depression , she stopped taking her medications because she was tired of it. Pt denied SI, HI, and hallucinations.pt voiced anxiety of 9/101, and depression of 9/10. On skin assessment, pat's skin looks good. Pt vital signs was within the normal level. Pt compliant with medications and care. Pt slept the rest of the night. Will continue to monitor behaviors, provide support and safe environment.

## 2020-06-18 NOTE — PLAN OF CARE
Problem: Patient Care Overview  Goal: Discharge Needs Assessment  Outcome: Ongoing (interventions implemented as appropriate)  Flowsheets  Taken 6/18/2020 1701  Concerns Comments: Pt will return home.  Pt will participate in therapeutic groups/activities on the unit.  SW will explore outpt providers for continuity of care.  Taken 6/18/2020 1708  Transportation Concerns: car, none  Concerns to be Addressed: coping/stress;decision making;discharge planning;mental health;suicidal  Patient/Family Anticipated Services at Transition: mental health services  Patient/Family Anticipates Transition to: home with family

## 2020-06-18 NOTE — PROGRESS NOTES
Continued Stay Note  [facility]     Patient Name: Silvia Guillory  MRN: [id number]  Today's Date: 6/18/2020    Admit Date: 6/18/2020    Discharge Plan     Row Name 06/18/20 1703       Plan    Plan   Pt will return home.  Pt will participate in therapeutic groups/activities on the unit.  SW will explore outpt providers for continuity of care.    Plan Comments  SW met w/pt to discuss tx & d/c planning.  Pt was able to verify demographic info as well as PCP, Daniel Monaco.   SW verified emergency contacts & confirmed that her spouse could be contacted.  SW inquired about outpt mental health providers; pt stated that she sees Dr. Calderon Bañuelos in [place], [place].  Pt stated that she use to see Reyna Benoit at [facility] and would like to resume svcs w/her.        Discharge Codes    No documentation.             DOMINGUEZ Hurst

## 2020-06-18 NOTE — PLAN OF CARE
Pt calm and cooperative. Mood is flat and anxious, c/o depression and anxiety 10/10. She wants to start a new medication regime as she does not feel her prior prescriptions were working well for her. She denies SI. She has been active in milieu and attending groups.     Problem: Patient Care Overview  Goal: Plan of Care Review  Outcome: Ongoing (interventions implemented as appropriate)  Flowsheets  Taken 6/18/2020 1622  Progress: improving  Taken 6/18/2020 0853  Plan of Care Reviewed With: patient  Patient Agreement with Plan of Care: agrees  Goal: Individualization and Mutuality  Outcome: Ongoing (interventions implemented as appropriate)  Goal: Discharge Needs Assessment  Outcome: Ongoing (interventions implemented as appropriate)  Goal: Interprofessional Rounds/Family Conf  Outcome: Ongoing (interventions implemented as appropriate)     Problem: Overarching Goals (Adult)  Goal: Adheres to Safety Considerations for Self and Others  Outcome: Ongoing (interventions implemented as appropriate)  Goal: Optimized Coping Skills in Response to Life Stressors  Outcome: Ongoing (interventions implemented as appropriate)  Goal: Develops/Participates in Therapeutic [place] to Support Successful Transition  Outcome: Ongoing (interventions implemented as appropriate)     Problem: Suicidal Behavior (Adult)  Goal: Suicidal Behavior is Absent/Minimized/Managed  Outcome: Ongoing (interventions implemented as appropriate)

## 2020-06-18 NOTE — ED PROVIDER NOTES
ED Course as of Jun 17 2232 Wed Jun 17, 2020 2000 Care assumed from Dr. Ball pending access evaluation.    [EE]   2214 Discussed with [facility], from [facility].  He has interviewed pt.  Plan for likely admission.      [EE]   2232 Armani has discussed with Dr. Abdalla.  [facility].    [EE]   2232 I have discussed this with the patient.  She is in agreement.    [EE]      ED Course User Index  [EE] Jaime Yost, Jaime Mancia, PA  06/17/20 2233

## 2020-06-19 RX ORDER — CETIRIZINE HYDROCHLORIDE 10 MG/1
10 TABLET ORAL DAILY
Status: DISCONTINUED | OUTPATIENT
Start: 2020-06-19 | End: 2020-06-22 | Stop reason: HOSPADM

## 2020-06-19 RX ORDER — DOXEPIN HYDROCHLORIDE 25 MG/1
25 CAPSULE ORAL NIGHTLY PRN
Status: DISCONTINUED | OUTPATIENT
Start: 2020-06-19 | End: 2020-06-22 | Stop reason: HOSPADM

## 2020-06-19 RX ADMIN — TOPIRAMATE 50 MG: 50 TABLET, FILM COATED ORAL at 09:19

## 2020-06-19 RX ADMIN — DESVENLAFAXINE SUCCINATE 50 MG: 50 TABLET, EXTENDED RELEASE ORAL at 09:19

## 2020-06-19 RX ADMIN — PANTOPRAZOLE SODIUM 40 MG: 40 TABLET, DELAYED RELEASE ORAL at 20:32

## 2020-06-19 RX ADMIN — CETIRIZINE HYDROCHLORIDE 10 MG: 10 TABLET, FILM COATED ORAL at 15:06

## 2020-06-19 RX ADMIN — ACETAMINOPHEN 650 MG: 325 TABLET, FILM COATED ORAL at 09:24

## 2020-06-19 RX ADMIN — LURASIDONE HYDROCHLORIDE 20 MG: 20 TABLET, FILM COATED ORAL at 17:23

## 2020-06-19 RX ADMIN — LISINOPRIL 10 MG: 10 TABLET ORAL at 20:32

## 2020-06-19 RX ADMIN — ACETAMINOPHEN 650 MG: 325 TABLET, FILM COATED ORAL at 15:06

## 2020-06-19 RX ADMIN — ROPINIROLE HYDROCHLORIDE 0.5 MG: 0.5 TABLET, FILM COATED ORAL at 20:32

## 2020-06-19 RX ADMIN — TOPIRAMATE 50 MG: 50 TABLET, FILM COATED ORAL at 20:32

## 2020-06-19 NOTE — PLAN OF CARE
Problem: Patient Care Overview  Goal: Plan of Care Review  Outcome: Ongoing (interventions implemented as appropriate)  Flowsheets (Taken 6/19/2020 0336)  Progress: improving  Plan of Care Reviewed With: patient  Patient Agreement with Plan of Care: agrees  Note:   Pt was pleasant, cooperative, compliant with medications and care. Pt denied SI, HI, and hallucinations. Pt voiced anxiety 8/10, and depression 8/10. Pt voiced feeling better than the previous day. Prn klonopin 0.5 mg was administered at 2104 per pt request for anxiety. Pt slept all night. Will continue to monitor behaviors, provide support and safe environment.

## 2020-06-19 NOTE — PROGRESS NOTES
Name: Silvia Guillory ADMIT: 2020   : 1970  PCP: Saroj Frederick MD    MRN: [id number] LOS: 1 days   AGE/SEX: 50 y.o. female  ROOM: [facility]     Subjective   Subjective   Patient seen and examined chart reviewed she reports feeling better this afternoon.  Except for a mild headache which she attributes to her sinuses she is without complaint    Review of Systems   Denies chest pain denies shortness of air denies cough denies abdominal pain  Objective   Objective   Vital Signs  Temp:  [97.2 °F (36.2 °C)] 97.2 °F (36.2 °C)  Heart Rate:  [79-86] 79  Resp:  [16] 16  BP: (108-139)/(70-84) 108/70  SpO2:  [97 %] 97 %  on   ;   Device (Oxygen Therapy): room air  There is no height or weight on file to calculate BMI.  Physical Exam  General alert pleasant female  lying flat in bed no acute distress  Neck supple trachea midline without JVD or bruit  Lungs clear to auscultation good air exchange  Cardiovascular exam is regular rate and rhythm with normal S1 and S2  Abdomen is soft nontender  Extremities without edema warm with good perfusion  Neurologic nonfocal  Psychiatric awake alert cooperative pleasant.    Results Review:       I reviewed the patient's new clinical results.  Results from last 7 days   Lab Units 20   WBC 10*3/mm3 9.17   HEMOGLOBIN g/dL 12.1   PLATELETS 10*3/mm3 254     Results from last 7 days   Lab Units 20  15220   SODIUM mmol/L 141 138   POTASSIUM mmol/L 4.4 3.8   CHLORIDE mmol/L 110* 108*   CO2 mmol/L 20.1* 14.5*   BUN mg/dL 12 12   CREATININE mg/dL 0.87 0.79   GLUCOSE mg/dL 114* 141*   Estimated Creatinine Clearance: 85.4 mL/min (by C-G formula based on SCr of 0.87 mg/dL).  Results from last 7 days   Lab Units 20   ALBUMIN g/dL 3.90   BILIRUBIN mg/dL 0.2   ALK PHOS U/L 74   AST (SGOT) U/L 20   ALT (SGPT) U/L 25     Results from last 7 days   Lab Units 20  1527 20   CALCIUM mg/dL 9.1 9.3   ALBUMIN g/dL  --  3.90       No results  found for: HGBA1C, POCGLU    No image results found.        cetirizine 10 mg Oral Daily   Desvenlafaxine Succinate ER 50 mg Oral Daily   lisinopril 10 mg Oral Nightly   Lurasidone HCl 20 mg Oral Daily   pantoprazole 40 mg Oral Nightly   rOPINIRole 0.5 mg Oral Nightly   topiramate 50 mg Oral BID      Diet Regular       Assessment/Plan     Active Hospital Problems    Diagnosis  POA   • Major depressive disorder, recurrent severe without psychotic features (CMS/HCC) [F33.2]  Yes   • Snoring [R06.83]  Unknown   • Abnormal TSH [R79.89]  Unknown   • Weight gain [R63.5]  Unknown   • Metabolic acidosis [E87.2]  Unknown   • Hypertension [I10]  Unknown   • Restless leg [G25.81]  Unknown   • Migraine [G43.909]  Unknown      Resolved Hospital Problems   No resolved problems to display.       50 y.o. female admitted with <principal problem not specified>.    · Hypertension blood pressures well controlled on lisinopril  · Possible obstructive sleep apnea needs sleep study as an outpatient  · Abnormal TSH his T4 is normal he does not require treatment at this time.  But would repeat full thyroid panel after discharge in 4 to 6 weeks.  · Major anxiety depressive disorder without psychotic features for management to the psychiatric service patient appears to be improved from admission  · Headache has a history of migraines but this is not consistent with migraine feels it is related to allergies and sinuses.  ·   · Full code.  · Discussed with patient.  · Anticipate discharge home timing yet to be determined.      Dean Peterson MD  [facility]  06/19/20  17:39

## 2020-06-19 NOTE — PLAN OF CARE
Pt is brighter and more hopeful. She continues to endorse some anxiety and depression but denies SI. She relates much of her current episode to drastic family changes related to marriages of both of her sons and loss of matriarchal role. She is working with a dr and therapist and is happy with her care and medications changes made by Dr. Abdalla.     Problem: Patient Care Overview  Goal: Plan of Care Review  Outcome: Ongoing (interventions implemented as appropriate)  Flowsheets  Taken 6/19/2020 0423 by Daisha Patterson RN  Progress: improving  Taken 6/19/2020 0924 by Kenya Paniagua RN  Plan of Care Reviewed With: patient  Patient Agreement with Plan of Care: agrees  Goal: Individualization and Mutuality  Outcome: Ongoing (interventions implemented as appropriate)  Goal: Discharge Needs Assessment  Outcome: Ongoing (interventions implemented as appropriate)  Goal: Interprofessional Rounds/Family Conf  Outcome: Ongoing (interventions implemented as appropriate)     Problem: Overarching Goals (Adult)  Goal: Adheres to Safety Considerations for Self and Others  Outcome: Ongoing (interventions implemented as appropriate)  Goal: Optimized Coping Skills in Response to Life Stressors  Outcome: Ongoing (interventions implemented as appropriate)  Goal: Develops/Participates in Therapeutic [place] to Support Successful Transition  Outcome: Ongoing (interventions implemented as appropriate)     Problem: Suicidal Behavior (Adult)  Goal: Suicidal Behavior is Absent/Minimized/Managed  Outcome: Ongoing (interventions implemented as appropriate)

## 2020-06-19 NOTE — PROGRESS NOTES
Patient is seen, evaluated, and chart reviewed. Discussed with staff.  Patient is seen for Dr. Hidalgo.    Staff reports that patient has been cooperative and compliant with medications.  No behavioral issues.  Patient has been attending groups.    On examination, patient is found in the milieu.  Patient slept somewhat poorly last night.  Mood is less depressed and less anxious.  Affect congruent.  No SI/HI/AVH.  Thought processes are mildly circumstantial.  Judgment and insight are okay.    No medication side effects.  She has tolerated the initiation of Latuda 20 mg daily and Pristiq 50 mg daily.  Patient reports that she is on Allegra at home.  I will provide Zyrtec 10 mg daily.  I will initiate doxepin 25 mg as needed for sleep.  Patient is provided with supportive therapy.  I discussed with patient discharge planning.  Patient lives in [place], but may be able to stay with family in [place] to attend the [facility].  Continue current medications, therapy, and inpatient treatment plan for safety and stabilization.

## 2020-06-20 RX ORDER — PANTOPRAZOLE SODIUM 40 MG/1
40 TABLET, DELAYED RELEASE ORAL EVERY MORNING
Status: DISCONTINUED | OUTPATIENT
Start: 2020-06-21 | End: 2020-06-22 | Stop reason: HOSPADM

## 2020-06-20 RX ADMIN — DESVENLAFAXINE SUCCINATE 50 MG: 50 TABLET, EXTENDED RELEASE ORAL at 08:09

## 2020-06-20 RX ADMIN — LURASIDONE HYDROCHLORIDE 20 MG: 20 TABLET, FILM COATED ORAL at 18:15

## 2020-06-20 RX ADMIN — ROPINIROLE HYDROCHLORIDE 0.5 MG: 0.5 TABLET, FILM COATED ORAL at 21:08

## 2020-06-20 RX ADMIN — CETIRIZINE HYDROCHLORIDE 10 MG: 10 TABLET, FILM COATED ORAL at 08:10

## 2020-06-20 RX ADMIN — DOXEPIN HYDROCHLORIDE 25 MG: 25 CAPSULE ORAL at 21:08

## 2020-06-20 RX ADMIN — TOPIRAMATE 50 MG: 50 TABLET, FILM COATED ORAL at 08:09

## 2020-06-20 RX ADMIN — TOPIRAMATE 50 MG: 50 TABLET, FILM COATED ORAL at 21:08

## 2020-06-20 RX ADMIN — LISINOPRIL 10 MG: 10 TABLET ORAL at 21:08

## 2020-06-20 NOTE — PLAN OF CARE
Problem: Patient Care Overview  Goal: Plan of Care Review  Outcome: Ongoing (interventions implemented as appropriate)  Flowsheets  Taken 6/19/2020 0423 by Daisha Patterson RN  Progress: improving  Taken 6/19/2020 2100 by Estelita Izaguirre RN  Plan of Care Reviewed With: patient  Patient Agreement with Plan of Care: agrees  Taken 6/20/2020 0511 by Estelita Izaguirre RN  Outcome Summary: Patient reports feeling much better this today with her depression. Patient denies SI or HI. Patient slept about 7 hours during he night . No complaints voiced.  Goal: Individualization and Mutuality  Outcome: Ongoing (interventions implemented as appropriate)  Goal: Discharge Needs Assessment  Outcome: Ongoing (interventions implemented as appropriate)  Goal: Interprofessional Rounds/Family Conf  Outcome: Ongoing (interventions implemented as appropriate)     Problem: Overarching Goals (Adult)  Goal: Adheres to Safety Considerations for Self and Others  Outcome: Ongoing (interventions implemented as appropriate)  Goal: Optimized Coping Skills in Response to Life Stressors  Outcome: Ongoing (interventions implemented as appropriate)  Goal: Develops/Participates in Therapeutic [place] to Support Successful Transition  Outcome: Ongoing (interventions implemented as appropriate)     Problem: Suicidal Behavior (Adult)  Goal: Suicidal Behavior is Absent/Minimized/Managed  Outcome: Ongoing (interventions implemented as appropriate)

## 2020-06-20 NOTE — PROGRESS NOTES
"Patient is seen, evaluated, and chart reviewed. Discussed with staff.  Patient is seen for Dr. Hidalgo.    Staff reports that patient has been cooperative and compliant with medications.  No behavioral issues.  She has been attending groups.    On examination, patient is found in her room.  Patient slept well last night.  Mood is \"good.\"  Affect congruent.  No SI/HI/AVH.  Thought processes are more goal-directed.  Judgment and insight are okay.    No medication side effects.  No headache today.  Patient is provided with supportive therapy.  Discussed with patient discharge planning.  Should she wish to enter into [facility], a referral would be made to the [facility]. Continue current medications, therapy, and inpatient treatment plan for safety and stabilization.    "

## 2020-06-20 NOTE — PLAN OF CARE
Problem: Patient Care Overview  Goal: Plan of Care Review  Outcome: Ongoing (interventions implemented as appropriate)  Flowsheets  Taken 6/19/2020 0423 by Daisha Patterson RN  Progress: improving  Taken 6/20/2020 1523 by Emmanuel Mayen RN  Plan of Care Reviewed With: patient  Patient Agreement with Plan of Care: agrees  Outcome Summary: Patient is calm and pleasant, participating in group therapy and openly discusses her struggles with depression.  States she has been on antidepressant medication for 15 years and has had no problems, but that it stopped working in March.  She has recently been reading up on methods of suicide, and  convinced her to seek inpatient help.  Today she denies SI and HI, rates anxiety 0 and depression 3.  Contracts with this RN for safety.     Problem: Overarching Goals (Adult)  Goal: Adheres to Safety Considerations for Self and Others  Outcome: Ongoing (interventions implemented as appropriate)  Flowsheets (Taken 6/20/2020 1523)  Adheres to Safety Considerations for Self and Others: making progress toward outcome     Problem: Overarching Goals (Adult)  Goal: Optimized Coping Skills in Response to Life Stressors  Outcome: Ongoing (interventions implemented as appropriate)  Flowsheets (Taken 6/20/2020 1523)  Optimized Coping Skills in Response to Life Stressors: making progress toward outcome     Problem: Overarching Goals (Adult)  Goal: Develops/Participates in Therapeutic [place] to Support Successful Transition  Outcome: Ongoing (interventions implemented as appropriate)  Flowsheets (Taken 6/20/2020 1523)  Develops/Participates in Therapeutic [place] to Support Successful Transition: making progress toward outcome     Problem: Suicidal Behavior (Adult)  Goal: Suicidal Behavior is Absent/Minimized/Managed  Outcome: Ongoing (interventions implemented as appropriate)  Flowsheets (Taken 6/20/2020 1523)  Suicidal Behavior Managed/Minimized Action Step (STG) Outcome: making  progress toward outcome

## 2020-06-21 RX ADMIN — ROPINIROLE HYDROCHLORIDE 0.5 MG: 0.5 TABLET, FILM COATED ORAL at 21:16

## 2020-06-21 RX ADMIN — DESVENLAFAXINE SUCCINATE 50 MG: 50 TABLET, EXTENDED RELEASE ORAL at 08:25

## 2020-06-21 RX ADMIN — CETIRIZINE HYDROCHLORIDE 10 MG: 10 TABLET, FILM COATED ORAL at 08:25

## 2020-06-21 RX ADMIN — CLONAZEPAM 0.5 MG: 0.5 TABLET ORAL at 21:16

## 2020-06-21 RX ADMIN — TOPIRAMATE 50 MG: 50 TABLET, FILM COATED ORAL at 21:16

## 2020-06-21 RX ADMIN — PANTOPRAZOLE SODIUM 40 MG: 40 TABLET, DELAYED RELEASE ORAL at 08:25

## 2020-06-21 RX ADMIN — LURASIDONE HYDROCHLORIDE 20 MG: 20 TABLET, FILM COATED ORAL at 17:56

## 2020-06-21 RX ADMIN — TOPIRAMATE 50 MG: 50 TABLET, FILM COATED ORAL at 08:25

## 2020-06-21 RX ADMIN — ACETAMINOPHEN, ASPIRIN AND CAFFEINE 2 TABLET: 250; 250; 65 TABLET, FILM COATED ORAL at 14:46

## 2020-06-21 RX ADMIN — DOXEPIN HYDROCHLORIDE 25 MG: 25 CAPSULE ORAL at 21:16

## 2020-06-21 RX ADMIN — LISINOPRIL 10 MG: 10 TABLET ORAL at 21:16

## 2020-06-21 NOTE — PLAN OF CARE
Problem: Patient Care Overview  Goal: Plan of Care Review  Outcome: Ongoing (interventions implemented as appropriate)  Flowsheets (Taken 6/21/2020 1651)  Progress: improving  Plan of Care Reviewed With: patient  Patient Agreement with Plan of Care: agrees  Outcome Summary: Patient states she has an improved mood today, and her affect is congruent with such.  One dose of PRN Excedrin was administered for migraine h/a.  Will CTM.     Problem: Overarching Goals (Adult)  Goal: Adheres to Safety Considerations for Self and Others  Outcome: Ongoing (interventions implemented as appropriate)  Flowsheets (Taken 6/21/2020 1651)  Adheres to Safety Considerations for Self and Others: making progress toward outcome     Problem: Overarching Goals (Adult)  Goal: Optimized Coping Skills in Response to Life Stressors  Outcome: Ongoing (interventions implemented as appropriate)  Flowsheets (Taken 6/21/2020 1651)  Optimized Coping Skills in Response to Life Stressors: making progress toward outcome     Problem: Overarching Goals (Adult)  Goal: Develops/Participates in Therapeutic [place] to Support Successful Transition  Outcome: Ongoing (interventions implemented as appropriate)  Flowsheets (Taken 6/21/2020 1651)  Develops/Participates in Therapeutic [place] to Support Successful Transition: making progress toward outcome

## 2020-06-21 NOTE — PLAN OF CARE
Problem: Patient Care Overview  Goal: Plan of Care Review  Outcome: Ongoing (interventions implemented as appropriate)  Flowsheets  Taken 6/21/2020 0355  Progress: improving  Taken 6/20/2020 2200  Plan of Care Reviewed With: patient  Patient Agreement with Plan of Care: agrees  Note:   Depression rated 4/10. Denied anxiety, SI/HI, and hallucinations. Cooperative and med compliant. Will continue to monitor and assess.      Goal: Individualization and Mutuality  Outcome: Ongoing (interventions implemented as appropriate)  Goal: Discharge Needs Assessment  Outcome: Ongoing (interventions implemented as appropriate)  Goal: Interprofessional Rounds/Family Conf  Outcome: Ongoing (interventions implemented as appropriate)     Problem: Overarching Goals (Adult)  Goal: Adheres to Safety Considerations for Self and Others  Outcome: Ongoing (interventions implemented as appropriate)  Flowsheets (Taken 6/21/2020 0355)  Adheres to Safety Considerations for Self and Others: making progress toward outcome  Goal: Optimized Coping Skills in Response to Life Stressors  Outcome: Ongoing (interventions implemented as appropriate)  Flowsheets (Taken 6/21/2020 0355)  Optimized Coping Skills in Response to Life Stressors: making progress toward outcome  Goal: Develops/Participates in Therapeutic [place] to Support Successful Transition  Outcome: Ongoing (interventions implemented as appropriate)  Flowsheets (Taken 6/21/2020 0355)  Develops/Participates in Therapeutic [place] to Support Successful Transition: making progress toward outcome

## 2020-06-21 NOTE — PROGRESS NOTES
"Patient is seen, evaluated, and chart reviewed. Discussed with staff.  Patient is seen for Dr. Hidalgo.    Staff reports that patient has been cooperative and compliant with medications.  No behavioral issues.  She has been attending groups.    On examination, patient is found in the milieu.  Patient slept well last night with doxepin.  Mood is \"good.\"  Affect congruent.  No SI/HI/AVH.  Thought processes are more goal-directed.  Judgment and insight are okay.  Patient is complaining of a headache.    No medication side effects.  Patient is provided with supportive therapy.  We will provide Excedrin Migraine for headache.  Possible discharge tomorrow.  Patient does not wish to follow up at the [facility] at this time.  Continue current medications, therapy, and inpatient treatment plan for safety and stabilization.  Dr. Hidalgo will assume care tomorrow.  "

## 2020-06-22 VITALS
HEART RATE: 98 BPM | DIASTOLIC BLOOD PRESSURE: 82 MMHG | RESPIRATION RATE: 16 BRPM | SYSTOLIC BLOOD PRESSURE: 125 MMHG | OXYGEN SATURATION: 99 % | TEMPERATURE: 98.5 F

## 2020-06-22 RX ORDER — DESVENLAFAXINE SUCCINATE 50 MG/1
50 TABLET, EXTENDED RELEASE ORAL DAILY
Qty: 30 TABLET | Refills: 1 | Status: SHIPPED | OUTPATIENT
Start: 2020-06-23 | End: 2022-12-29

## 2020-06-22 RX ORDER — LURASIDONE HYDROCHLORIDE 20 MG/1
20 TABLET, FILM COATED ORAL DAILY
Qty: 30 TABLET | Refills: 0 | Status: SHIPPED | OUTPATIENT
Start: 2020-06-22

## 2020-06-22 RX ORDER — ROPINIROLE 0.5 MG/1
0.5 TABLET, FILM COATED ORAL NIGHTLY
Qty: 30 TABLET | Refills: 0 | Status: ON HOLD | OUTPATIENT
Start: 2020-06-22 | End: 2022-06-24

## 2020-06-22 RX ORDER — DOXEPIN HYDROCHLORIDE 25 MG/1
25 CAPSULE ORAL NIGHTLY PRN
Qty: 30 CAPSULE | Refills: 0 | Status: SHIPPED | OUTPATIENT
Start: 2020-06-22 | End: 2022-12-29

## 2020-06-22 RX ADMIN — CETIRIZINE HYDROCHLORIDE 10 MG: 10 TABLET, FILM COATED ORAL at 08:35

## 2020-06-22 RX ADMIN — DESVENLAFAXINE SUCCINATE 50 MG: 50 TABLET, EXTENDED RELEASE ORAL at 08:35

## 2020-06-22 RX ADMIN — TOPIRAMATE 50 MG: 50 TABLET, FILM COATED ORAL at 08:35

## 2020-06-22 RX ADMIN — PANTOPRAZOLE SODIUM 40 MG: 40 TABLET, DELAYED RELEASE ORAL at 08:35

## 2020-06-22 NOTE — PROGRESS NOTES
Continued Stay Note  [facility]     Patient Name: Silvia Guillory  MRN: [id number]  Today's Date: 6/22/2020    Admit Date: 6/18/2020    Discharge Plan     Row Name 06/22/20 1305       Plan    Patient/Family in Agreement with Plan  yes    Final Discharge Disposition Code  01 - home or self-care    Final Note  Pt d/c per Dr. Hidalgo's orders. Pt will follow up w/ Dr. Calderon Bañuelos and [facility] for ongoing outpt svcs. Pt transported by spouse.        Discharge Codes    No documentation.       Expected Discharge Date and Time     Expected Discharge Date Expected Discharge Time    Jun 22, 2020             KOBY Kim

## 2020-06-22 NOTE — DISCHARGE SUMMARY
DATES OF ADMISSION: 6/18/2020-6/22/2020    REASON FOR ADMISSION: The patient is a 50-year-old white female admitted with worsening depression and suicidal thinking.    LABS: See chart    HOSPITAL COURSE: Patient was admitted to the crisis management unit under the care of Dr. Abdalla.  Dr. Davison started the patient on a combination of Latuda, Pristiq and PRN clonazepam as well as PRN doxepin for sleep.  The patient showed rapid improvement in mood and reduction in suicidal ideation.  She requested discharge on 622 when seen by this physician and it was ordered.  At that time she denied suicidal lesion and expressed interest in continued psychiatric treatment in [place], [place].    FINAL DIAGNOSIS: Major depressive sworder recurrent moderate, environmental allergies, GERD    DISPOSITION ON DISCHARGE: A full listing the patient's medications is provided below.  Follow-up will take place with community mental health resources in the [place], [place] area.  The patient is informed of the risks and benefits of medications ordered with possible risk of tardive dyskinesia prolonged use of antipsychotic medication such as Latuda.  She is likewise apprised of FDA mandated warnings regarding effects of atypical antipsychotics on the metabolism of lipids and glucose.    PROGNOSIS: Good       Discharge Medications      New Medications      Instructions Start Date   desvenlafaxine 50 MG 24 hr tablet  Commonly known as:  PRISTIQ   50 mg, Oral, Daily   Start Date:  June 23, 2020     doxepin 25 MG capsule  Commonly known as:  SINEquan   25 mg, Oral, Nightly PRN      Lurasidone HCl 20 MG tablet tablet  Commonly known as:  LATUDA   20 mg, Oral, Daily         Changes to Medications      Instructions Start Date   rOPINIRole 0.25 MG tablet  Commonly known as:  REQUIP  What changed:  Another medication with the same name was added. Make sure you understand how and when to take each.   0.5 mg, Oral, Nightly, Take 1 hour before bedtime,  take 2 tabs nightly       rOPINIRole 0.5 MG tablet  Commonly known as:  REQUIP  What changed:  You were already taking a medication with the same name, and this prescription was added. Make sure you understand how and when to take each.   0.5 mg, Oral, Nightly, Take 1 hour before bedtime.      topiramate 50 MG tablet  Commonly known as:  TOPAMAX  What changed:  additional instructions   50 mg, Oral, 2 Times Daily         Continue These Medications      Instructions Start Date   aspirin-acetaminophen-caffeine 250-250-65 MG per tablet  Commonly known as:  EXCEDRIN MIGRAINE   2 tablets, Oral, Every 6 Hours PRN      clonazePAM 0.5 MG tablet  Commonly known as:  KlonoPIN   1 tablet, Oral, 2 Times Daily PRN      lisinopril 10 MG tablet  Commonly known as:  PRINIVIL,ZESTRIL   10 mg, Oral, Nightly      omeprazole 20 MG capsule  Commonly known as:  priLOSEC   20 mg, Oral, Nightly      SUMAtriptan 50 MG tablet  Commonly known as:  IMITREX   Take one tablet at onset of headache. May repeat dose one time in 2 hours if headache not relieved.         Stop These Medications    buPROPion  MG 24 hr tablet  Commonly known as:  WELLBUTRIN XL

## 2020-06-22 NOTE — PLAN OF CARE
Problem: Patient Care Overview  Goal: Plan of Care Review  Outcome: Ongoing (interventions implemented as appropriate)  Flowsheets  Taken 6/22/2020 0330  Progress: improving  Taken 6/22/2020 0200  Plan of Care Reviewed With: patient  Patient Agreement with Plan of Care: agrees  Note:   Voiced anxiety r/t discharge. Administered PRN medication for agitation/anxiety (see MAR). Denied depression, SI/HI, and hallucinations. Cooperative and med compliant. Will continue to monitor and assess.      Goal: Individualization and Mutuality  Outcome: Ongoing (interventions implemented as appropriate)  Goal: Discharge Needs Assessment  Outcome: Ongoing (interventions implemented as appropriate)  Goal: Interprofessional Rounds/Family Conf  Outcome: Ongoing (interventions implemented as appropriate)     Problem: Overarching Goals (Adult)  Goal: Adheres to Safety Considerations for Self and Others  Outcome: Ongoing (interventions implemented as appropriate)  Flowsheets (Taken 6/22/2020 0330)  Adheres to Safety Considerations for Self and Others: making progress toward outcome  Goal: Optimized Coping Skills in Response to Life Stressors  Outcome: Ongoing (interventions implemented as appropriate)  Flowsheets (Taken 6/22/2020 0330)  Optimized Coping Skills in Response to Life Stressors: making progress toward outcome  Goal: Develops/Participates in Therapeutic [place] to Support Successful Transition  Outcome: Ongoing (interventions implemented as appropriate)  Flowsheets (Taken 6/22/2020 0330)  Develops/Participates in Therapeutic [place] to Support Successful Transition: making progress toward outcome

## 2020-06-23 ENCOUNTER — TELEPHONE (OUTPATIENT)
Dept: PSYCHIATRY | Facility: HOSPITAL | Age: 50
End: 2020-06-23

## 2020-06-23 NOTE — TELEPHONE ENCOUNTER
"Patient reports she is doing \"great.\"  She states she has all medications except the Latuda, but reports that her doctor is working on it.  She states she has missed one dose. She states that she understands her discharge instructions.  She was complimentary of staff and care.  No further assistance requested at this time.  "

## 2022-03-01 ENCOUNTER — TELEPHONE (OUTPATIENT)
Dept: GASTROENTEROLOGY | Facility: CLINIC | Age: 52
End: 2022-03-01

## 2022-03-01 ENCOUNTER — CLINICAL SUPPORT (OUTPATIENT)
Dept: GASTROENTEROLOGY | Facility: CLINIC | Age: 52
End: 2022-03-01

## 2022-03-01 ENCOUNTER — PREP FOR SURGERY (OUTPATIENT)
Dept: OTHER | Facility: HOSPITAL | Age: 52
End: 2022-03-01

## 2022-03-01 DIAGNOSIS — K21.9 CHRONIC GERD: Primary | ICD-10-CM

## 2022-03-01 NOTE — TELEPHONE ENCOUNTER
Silvia Guillory  REASON FOR CALL encounter for egd    Past Medical History:   Diagnosis Date   • Depression    • Hypertension    • Migraine    • Restless leg      Allergies   Allergen Reactions   • Penicillins    • Iodides Hives     Past Surgical History:   Procedure Laterality Date   • ADENOIDECTOMY     •  SECTION     • INNER EAR SURGERY     • TONSILLECTOMY       Social History     Socioeconomic History   • Marital status:    Tobacco Use   • Smoking status: Never Smoker   • Smokeless tobacco: Never Used   Vaping Use   • Vaping Use: Never used   Substance and Sexual Activity   • Alcohol use: No   • Drug use: No   • Sexual activity: Defer     Family History   Problem Relation Age of Onset   • Heart disease Mother    • Cancer Father         prostate colon    • COPD Father    • Thyroid disease Neg Hx        Current Outpatient Medications:   •  aspirin-acetaminophen-caffeine (EXCEDRIN MIGRAINE) 250-250-65 MG per tablet, Take 2 tablets by mouth Every 6 (Six) Hours As Needed for Headache., Disp: , Rfl:   •  clonazePAM (KlonoPIN) 0.5 MG tablet, Take 1 tablet by mouth 2 (two) times a day as needed., Disp: , Rfl:   •  desvenlafaxine (PRISTIQ) 50 MG 24 hr tablet, Take 1 tablet by mouth Daily. Indications: Major Depressive Disorder, Disp: 30 tablet, Rfl: 1  •  doxepin (SINEquan) 25 MG capsule, Take 1 capsule by mouth At Night As Needed for Sleep (anxiety). Indications: sleep, Disp: 30 capsule, Rfl: 0  •  lisinopril (PRINIVIL,ZESTRIL) 10 MG tablet, Take 10 mg by mouth Every Night., Disp: , Rfl:   •  lurasidone (LATUDA) 20 MG tablet tablet, Take 1 tablet by mouth Daily. Indications: Depressive Phase of Manic-Depression, Disp: 30 tablet, Rfl: 0  •  omeprazole (priLOSEC) 20 MG capsule, Take 20 mg by mouth Every Night., Disp: , Rfl:   •  OnabotulinumtoxinA (BOTOX IM), Inject  into the appropriate muscle as directed by prescriber., Disp: , Rfl:   •  Rimegepant Sulfate (NURTEC PO), Take  by mouth., Disp: , Rfl:   •   rOPINIRole (REQUIP) 0.25 MG tablet, Take 0.5 mg by mouth Every Night. Take 1 hour before bedtime, take 2 tabs nightly, Disp: , Rfl:   •  rOPINIRole (REQUIP) 0.5 MG tablet, Take 1 tablet by mouth Every Night. Take 1 hour before bedtime.  Indications: Restless Leg Syndrome, Disp: 30 tablet, Rfl: 0  •  SUMAtriptan (IMITREX) 50 MG tablet, Take one tablet at onset of headache. May repeat dose one time in 2 hours if headache not relieved., Disp: 18 tablet, Rfl: 5  •  topiramate (TOPAMAX) 50 MG tablet, Take 1 tablet by mouth 2 (Two) Times a Day. (Patient taking differently: Take 50 mg by mouth 2 (Two) Times a Day. Takes 1-2 tabs ( mg) nightly, for migraines), Disp: 30 tablet, Rfl: 5

## 2022-03-01 NOTE — PROGRESS NOTES
SPOKE WITH PT ON A DATE FOR COLONOSCOPY OF 6/24/22 . MADE SURE CHART WAS UP TO DATE. WENT OVER PREP AND MAILED OUT INSTRUCTIONS. PUT IN ORDER FOR COLON AND SENT PREP TO PHARMACY

## 2022-06-10 ENCOUNTER — TELEPHONE (OUTPATIENT)
Dept: GASTROENTEROLOGY | Facility: CLINIC | Age: 52
End: 2022-06-10

## 2022-06-22 NOTE — PAT
Patient given arrival time of 0930 for the date of 6/24/22.  History, allergies, pharmacy, home medications, and prior surgeries and procedures reviewed with patient.          Patient instructed to hold medications the morning of procedure.          Educated patient about the procedure and what to expect.  Reinforced pre procedure instructions to include NPO, and went over discharge instructions.  Patient stated understanding and was able to teach back.    Patient stated that the office did NOT tell her about not being able to eat or drink.    PAT complete.     Shirin Noguera RN 12:05 EDT 6/22/2022

## 2022-06-24 ENCOUNTER — ANESTHESIA EVENT (OUTPATIENT)
Dept: GASTROENTEROLOGY | Facility: HOSPITAL | Age: 52
End: 2022-06-24

## 2022-06-24 ENCOUNTER — HOSPITAL ENCOUNTER (OUTPATIENT)
Facility: HOSPITAL | Age: 52
Setting detail: HOSPITAL OUTPATIENT SURGERY
Discharge: HOME | End: 2022-06-24
Attending: INTERNAL MEDICINE | Admitting: INTERNAL MEDICINE

## 2022-06-24 ENCOUNTER — ANESTHESIA (OUTPATIENT)
Dept: GASTROENTEROLOGY | Facility: HOSPITAL | Age: 52
End: 2022-06-24

## 2022-06-24 VITALS
HEART RATE: 92 BPM | WEIGHT: 224.87 LBS | RESPIRATION RATE: 15 BRPM | TEMPERATURE: 97 F | BODY MASS INDEX: 38.39 KG/M2 | SYSTOLIC BLOOD PRESSURE: 125 MMHG | DIASTOLIC BLOOD PRESSURE: 81 MMHG | OXYGEN SATURATION: 97 % | HEIGHT: 64 IN

## 2022-06-24 DIAGNOSIS — K21.9 CHRONIC GERD: ICD-10-CM

## 2022-06-24 PROCEDURE — 88305 TISSUE EXAM BY PATHOLOGIST: CPT | Performed by: INTERNAL MEDICINE

## 2022-06-24 PROCEDURE — 25010000002 PROPOFOL 10 MG/ML EMULSION: Performed by: NURSE ANESTHETIST, CERTIFIED REGISTERED

## 2022-06-24 PROCEDURE — 43239 EGD BIOPSY SINGLE/MULTIPLE: CPT | Performed by: INTERNAL MEDICINE

## 2022-06-24 RX ORDER — UBIDECARENONE 75 MG
50 CAPSULE ORAL DAILY
COMMUNITY

## 2022-06-24 RX ORDER — CHOLECALCIFEROL (VITAMIN D3) 125 MCG
CAPSULE ORAL
COMMUNITY
End: 2022-12-29

## 2022-06-24 RX ORDER — FEXOFENADINE HCL 180 MG/1
180 TABLET ORAL DAILY
COMMUNITY

## 2022-06-24 RX ORDER — LEVOTHYROXINE SODIUM 0.03 MG/1
25 TABLET ORAL DAILY
COMMUNITY

## 2022-06-24 RX ORDER — ATENOLOL 25 MG/1
25 TABLET ORAL DAILY
COMMUNITY

## 2022-06-24 RX ORDER — LIDOCAINE HYDROCHLORIDE 20 MG/ML
INJECTION, SOLUTION EPIDURAL; INFILTRATION; INTRACAUDAL; PERINEURAL AS NEEDED
Status: DISCONTINUED | OUTPATIENT
Start: 2022-06-24 | End: 2022-06-24 | Stop reason: SURG

## 2022-06-24 RX ORDER — GLYCOPYRROLATE 0.2 MG/ML
INJECTION INTRAMUSCULAR; INTRAVENOUS AS NEEDED
Status: DISCONTINUED | OUTPATIENT
Start: 2022-06-24 | End: 2022-06-24 | Stop reason: SURG

## 2022-06-24 RX ORDER — PROPOFOL 10 MG/ML
VIAL (ML) INTRAVENOUS AS NEEDED
Status: DISCONTINUED | OUTPATIENT
Start: 2022-06-24 | End: 2022-06-24 | Stop reason: SURG

## 2022-06-24 RX ORDER — BIOTIN 5 MG
TABLET ORAL
COMMUNITY

## 2022-06-24 RX ORDER — SODIUM CHLORIDE, SODIUM LACTATE, POTASSIUM CHLORIDE, CALCIUM CHLORIDE 600; 310; 30; 20 MG/100ML; MG/100ML; MG/100ML; MG/100ML
30 INJECTION, SOLUTION INTRAVENOUS CONTINUOUS
Status: DISCONTINUED | OUTPATIENT
Start: 2022-06-24 | End: 2022-06-24 | Stop reason: HOSPADM

## 2022-06-24 RX ADMIN — LIDOCAINE HYDROCHLORIDE 100 MG: 20 INJECTION, SOLUTION EPIDURAL; INFILTRATION; INTRACAUDAL; PERINEURAL at 11:36

## 2022-06-24 RX ADMIN — SODIUM CHLORIDE, POTASSIUM CHLORIDE, SODIUM LACTATE AND CALCIUM CHLORIDE 30 ML/HR: 600; 310; 30; 20 INJECTION, SOLUTION INTRAVENOUS at 10:33

## 2022-06-24 RX ADMIN — PROPOFOL 200 MCG/KG/MIN: 10 INJECTION, EMULSION INTRAVENOUS at 11:36

## 2022-06-24 RX ADMIN — GLYCOPYRROLATE 0.2 MG: 0.2 INJECTION INTRAMUSCULAR; INTRAVENOUS at 11:40

## 2022-06-24 RX ADMIN — PROPOFOL 100 MG: 10 INJECTION, EMULSION INTRAVENOUS at 11:36

## 2022-06-24 NOTE — ANESTHESIA PREPROCEDURE EVALUATION
Anesthesia Evaluation     Patient summary reviewed and Nursing notes reviewed   no history of anesthetic complications:  NPO Solid Status: > 8 hours  NPO Liquid Status: > 2 hours           Airway   Mallampati: II  TM distance: >3 FB  Neck ROM: full  No difficulty expected  Dental      Pulmonary - negative pulmonary ROS and normal exam    breath sounds clear to auscultation  Cardiovascular - normal exam  Exercise tolerance: good (4-7 METS)    Rhythm: regular  Rate: normal    (+) hypertension,       Neuro/Psych  (+) psychiatric history Depression,    GI/Hepatic/Renal/Endo - negative ROS     Musculoskeletal (-) negative ROS    Abdominal    Substance History - negative use     OB/GYN negative ob/gyn ROS         Other - negative ROS                       Anesthesia Plan    ASA 3     general       Anesthetic plan, risks, benefits, and alternatives have been provided, discussed and informed consent has been obtained with: patient.        CODE STATUS:

## 2022-06-24 NOTE — ANESTHESIA POSTPROCEDURE EVALUATION
Patient: Silvia Guillory    Procedure Summary     Date: 06/24/22 Room / Location: [facility] ENDOSCOPY 2 / [facility] ENDOSCOPY    Anesthesia Start: 1134 Anesthesia Stop: 1148    Procedure: ESOPHAGOGASTRODUODENOSCOPY WITH BIOPSIES (N/A ) Diagnosis:       Chronic GERD      (Chronic GERD [K21.9])    Surgeons: Radha Jara MD Provider: Jake Ventura MD    Anesthesia Type: general ASA Status: 3          Anesthesia Type: general    Vitals  Vitals Value Taken Time   /76 06/24/22 1152   Temp 36.4 °C (97.6 °F) 06/24/22 1152   Pulse 88 06/24/22 1157   Resp 13 06/24/22 1152   SpO2 97 % 06/24/22 1156   Vitals shown include unvalidated device data.        Post Anesthesia Care and Evaluation    Patient location during evaluation: bedside  Patient participation: complete - patient participated  Level of consciousness: awake  Pain score: 0  Pain management: adequate    Airway patency: patent  Anesthetic complications: No anesthetic complications  PONV Status: none  Cardiovascular status: acceptable and stable  Respiratory status: acceptable and room air  Hydration status: acceptable    Comments: An Anesthesiologist personally participated in the most demanding procedures (including induction and emergence if applicable) in the anesthesia plan, monitored the course of anesthesia administration at frequent intervals and remained physically present and available for immediate diagnosis and treatment of emergencies.

## 2022-06-24 NOTE — H&P
Pre Procedure History & Physical    Chief Complaint:   GERD    Subjective     HPI:   53 yo F here for eval of GERD.    Past Medical History:   Past Medical History:   Diagnosis Date   • Depression    • Hypertension    • Migraine    • Restless leg        Past Surgical History:  Past Surgical History:   Procedure Laterality Date   • ADENOIDECTOMY     •  SECTION     • INNER EAR SURGERY     • POSTPARTUM TUBAL LIGATION  1998   • TONSILLECTOMY         Family History:  Family History   Problem Relation Age of Onset   • Heart disease Mother    • Cancer Father         prostate colon    • COPD Father    • Thyroid disease Neg Hx        Social History:   reports that she has never smoked. She has never used smokeless tobacco. She reports that she does not drink alcohol and does not use drugs.    Medications:   Medications Prior to Admission   Medication Sig Dispense Refill Last Dose   • clonazePAM (KlonoPIN) 0.5 MG tablet Take 1 tablet by mouth 2 (two) times a day as needed.   2022 at Unknown time   • desvenlafaxine (PRISTIQ) 50 MG 24 hr tablet Take 1 tablet by mouth Daily. Indications: Major Depressive Disorder 30 tablet 1 2022 at Unknown time   • doxepin (SINEquan) 25 MG capsule Take 1 capsule by mouth At Night As Needed for Sleep (anxiety). Indications: sleep 30 capsule 0 2022 at Unknown time   • lisinopril (PRINIVIL,ZESTRIL) 10 MG tablet Take 10 mg by mouth Every Night.   2022 at Unknown time   • lurasidone (LATUDA) 20 MG tablet tablet Take 1 tablet by mouth Daily. Indications: Depressive Phase of Manic-Depression 30 tablet 0 2022 at Unknown time   • omeprazole (priLOSEC) 20 MG capsule Take 20 mg by mouth Every Night.   2022 at Unknown time   • OnabotulinumtoxinA (BOTOX IM) Inject  into the appropriate muscle as directed by prescriber.   Past Month at Unknown time   • Rimegepant Sulfate (NURTEC PO) Take  by mouth.   Past Week at Unknown time   • rOPINIRole (REQUIP) 0.25 MG tablet  "Take 0.5 mg by mouth Every Night. Take 1 hour before bedtime, take 2 tabs nightly   Indications: Restless Leg Syndrome   6/21/2022 at Unknown time   • rOPINIRole (REQUIP) 0.5 MG tablet Take 1 tablet by mouth Every Night. Take 1 hour before bedtime.  Indications: Restless Leg Syndrome 30 tablet 0 6/21/2022 at Unknown time       Allergies:  Penicillins and Iodides    ROS:    Pertinent items are noted in HPI     Objective     Height 162.6 cm (64\"), weight 102 kg (224 lb 13.9 oz), last menstrual period 03/02/2022.    Physical Exam   Constitutional: Pt is oriented to person, place, and time and well-developed, well-nourished, and in no distress.   Mouth/Throat: Oropharynx is clear and moist.   Neck: Normal range of motion.   Cardiovascular: Normal rate, regular rhythm and normal heart sounds.    Pulmonary/Chest: Effort normal and breath sounds normal.   Abdominal: Soft. Nontender  Skin: Skin is warm and dry.   Psychiatric: Mood, memory, affect and judgment normal.     Assessment & Plan     Diagnosis:  GERD    Anticipated Surgical Procedure:  EGD    The risks, benefits, and alternatives of this procedure have been discussed with the patient or the responsible party- the patient understands and agrees to proceed.          "

## 2022-06-27 LAB
CYTO UR: NORMAL
LAB AP CASE REPORT: NORMAL
LAB AP CLINICAL INFORMATION: NORMAL
PATH REPORT.FINAL DX SPEC: NORMAL
PATH REPORT.GROSS SPEC: NORMAL

## 2022-06-30 ENCOUNTER — TELEPHONE (OUTPATIENT)
Dept: GASTROENTEROLOGY | Facility: CLINIC | Age: 52
End: 2022-06-30

## 2022-06-30 NOTE — TELEPHONE ENCOUNTER
Spoke to pt and informed of Sa[facility] CANO note. Educated and mailed on Daniels's esophagus. F/U scheduled on 12/7/2022. Pt verbalized understanding. Apt reminder mailed.    1 year EGD recall placed.

## 2022-06-30 NOTE — TELEPHONE ENCOUNTER
----- Message from RACHAEL Ferguson sent at 6/29/2022  4:23 PM EDT -----  Biopsies are consistent with Daniels's esophagus.  Please place in recall for repeat EGD in 1 year to obtain four-quadrant biopsies for confirmation.  Schedule patient for follow-up.   subjective b/l LE weakness without objective findings associated w/lower back pain over the past several months; labs without electrolyte derangement; no signs of infection on labs or subjective; previous MR C/T/L spine 5/2021 and 6/2021 without acute findings; no trauma, falls, or worsening in LE weakness since these previous imaging studies  - neuro following; recommending MR head, C/T spine inpatient or outpatient; pending d/w neuro attending  - f/u MG w/u as pt previously w/ptosis; f/u TSH

## 2022-12-20 ENCOUNTER — TELEPHONE (OUTPATIENT)
Dept: GASTROENTEROLOGY | Facility: CLINIC | Age: 52
End: 2022-12-20

## 2022-12-20 NOTE — TELEPHONE ENCOUNTER
Dr Del Real office contacted our office asking to speak directly to Dr Jara or Dale Spencer.  Informed them that Dale was leaving our practice.  This patient is currently scheduled for a follow up with Saige in April 2023.  Patient is currently inpatient at [facility] and is a complicated case. Dr Del Real cellphone number is [phone]

## 2022-12-29 ENCOUNTER — HOSPITAL ENCOUNTER (INPATIENT)
Facility: HOSPITAL | Age: 52
LOS: 1 days | Discharge: HOME | DRG: 440 | End: 2022-12-30
Attending: EMERGENCY MEDICINE | Admitting: HOSPITALIST
Payer: MEDICAID

## 2022-12-29 ENCOUNTER — APPOINTMENT (OUTPATIENT)
Dept: CT IMAGING | Facility: HOSPITAL | Age: 52
DRG: 440 | End: 2022-12-29
Payer: MEDICAID

## 2022-12-29 DIAGNOSIS — K85.90 ACUTE PANCREATITIS, UNSPECIFIED COMPLICATION STATUS, UNSPECIFIED PANCREATITIS TYPE: Primary | ICD-10-CM

## 2022-12-29 LAB
ALBUMIN SERPL-MCNC: 4.3 G/DL (ref 3.5–5.2)
ALBUMIN/GLOB SERPL: 1.5 G/DL
ALP SERPL-CCNC: 92 U/L (ref 39–117)
ALT SERPL W P-5'-P-CCNC: 18 U/L (ref 1–33)
ANION GAP SERPL CALCULATED.3IONS-SCNC: 10.3 MMOL/L (ref 5–15)
AST SERPL-CCNC: 14 U/L (ref 1–32)
BASOPHILS # BLD AUTO: 0.01 10*3/MM3 (ref 0–0.2)
BASOPHILS NFR BLD AUTO: 0.1 % (ref 0–1.5)
BILIRUB SERPL-MCNC: 0.2 MG/DL (ref 0–1.2)
BILIRUB UR QL STRIP: NEGATIVE
BUN SERPL-MCNC: 11 MG/DL (ref 6–20)
BUN/CREAT SERPL: 13.9 (ref 7–25)
CALCIUM SPEC-SCNC: 9.7 MG/DL (ref 8.6–10.5)
CHLORIDE SERPL-SCNC: 104 MMOL/L (ref 98–107)
CLARITY UR: CLEAR
CO2 SERPL-SCNC: 23.7 MMOL/L (ref 22–29)
COLOR UR: YELLOW
CREAT SERPL-MCNC: 0.79 MG/DL (ref 0.57–1)
D-LACTATE SERPL-SCNC: 1.3 MMOL/L (ref 0.5–2)
DEPRECATED RDW RBC AUTO: 47.5 FL (ref 37–54)
EGFRCR SERPLBLD CKD-EPI 2021: 91.3 ML/MIN/1.73
EOSINOPHIL # BLD AUTO: 0.01 10*3/MM3 (ref 0–0.4)
EOSINOPHIL NFR BLD AUTO: 0.1 % (ref 0.3–6.2)
ERYTHROCYTE [DISTWIDTH] IN BLOOD BY AUTOMATED COUNT: 14.6 % (ref 12.3–15.4)
GLOBULIN UR ELPH-MCNC: 2.8 GM/DL
GLUCOSE SERPL-MCNC: 88 MG/DL (ref 65–99)
GLUCOSE UR STRIP-MCNC: NEGATIVE MG/DL
HCG INTACT+B SERPL-ACNC: <0.5 MIU/ML
HCT VFR BLD AUTO: 37.1 % (ref 34–46.6)
HGB BLD-MCNC: 11.7 G/DL (ref 12–15.9)
HGB UR QL STRIP.AUTO: NEGATIVE
HOLD SPECIMEN: NORMAL
HOLD SPECIMEN: NORMAL
IMM GRANULOCYTES # BLD AUTO: 0.05 10*3/MM3 (ref 0–0.05)
IMM GRANULOCYTES NFR BLD AUTO: 0.7 % (ref 0–0.5)
KETONES UR QL STRIP: NEGATIVE
LEUKOCYTE ESTERASE UR QL STRIP.AUTO: NEGATIVE
LIPASE SERPL-CCNC: 33 U/L (ref 13–60)
LYMPHOCYTES # BLD AUTO: 1.66 10*3/MM3 (ref 0.7–3.1)
LYMPHOCYTES NFR BLD AUTO: 24.5 % (ref 19.6–45.3)
MCH RBC QN AUTO: 28.5 PG (ref 26.6–33)
MCHC RBC AUTO-ENTMCNC: 31.5 G/DL (ref 31.5–35.7)
MCV RBC AUTO: 90.3 FL (ref 79–97)
MONOCYTES # BLD AUTO: 0.52 10*3/MM3 (ref 0.1–0.9)
MONOCYTES NFR BLD AUTO: 7.7 % (ref 5–12)
NEUTROPHILS NFR BLD AUTO: 4.53 10*3/MM3 (ref 1.7–7)
NEUTROPHILS NFR BLD AUTO: 66.9 % (ref 42.7–76)
NITRITE UR QL STRIP: NEGATIVE
NRBC BLD AUTO-RTO: 0 /100 WBC (ref 0–0.2)
PH UR STRIP.AUTO: 6 [PH] (ref 5–8)
PLATELET # BLD AUTO: 251 10*3/MM3 (ref 140–450)
PMV BLD AUTO: 9.4 FL (ref 6–12)
POTASSIUM SERPL-SCNC: 4.1 MMOL/L (ref 3.5–5.2)
PROT SERPL-MCNC: 7.1 G/DL (ref 6–8.5)
PROT UR QL STRIP: NEGATIVE
RBC # BLD AUTO: 4.11 10*6/MM3 (ref 3.77–5.28)
SODIUM SERPL-SCNC: 138 MMOL/L (ref 136–145)
SP GR UR STRIP: 1.01 (ref 1–1.03)
UROBILINOGEN UR QL STRIP: NORMAL
WBC NRBC COR # BLD: 6.78 10*3/MM3 (ref 3.4–10.8)
WHOLE BLOOD HOLD COAG: NORMAL
WHOLE BLOOD HOLD SPECIMEN: NORMAL

## 2022-12-29 PROCEDURE — 85025 COMPLETE CBC W/AUTO DIFF WBC: CPT | Performed by: EMERGENCY MEDICINE

## 2022-12-29 PROCEDURE — 84702 CHORIONIC GONADOTROPIN TEST: CPT | Performed by: EMERGENCY MEDICINE

## 2022-12-29 PROCEDURE — 99285 EMERGENCY DEPT VISIT HI MDM: CPT

## 2022-12-29 PROCEDURE — 36415 COLL VENOUS BLD VENIPUNCTURE: CPT

## 2022-12-29 PROCEDURE — 25010000002 HYDROMORPHONE 1 MG/ML SOLUTION: Performed by: EMERGENCY MEDICINE

## 2022-12-29 PROCEDURE — 81003 URINALYSIS AUTO W/O SCOPE: CPT | Performed by: EMERGENCY MEDICINE

## 2022-12-29 PROCEDURE — 99284 EMERGENCY DEPT VISIT MOD MDM: CPT

## 2022-12-29 PROCEDURE — 74177 CT ABD & PELVIS W/CONTRAST: CPT

## 2022-12-29 PROCEDURE — 83690 ASSAY OF LIPASE: CPT | Performed by: EMERGENCY MEDICINE

## 2022-12-29 PROCEDURE — 0 IOPAMIDOL PER 1 ML: Performed by: EMERGENCY MEDICINE

## 2022-12-29 PROCEDURE — 25010000002 ONDANSETRON PER 1 MG: Performed by: EMERGENCY MEDICINE

## 2022-12-29 PROCEDURE — 83605 ASSAY OF LACTIC ACID: CPT | Performed by: EMERGENCY MEDICINE

## 2022-12-29 PROCEDURE — 99223 1ST HOSP IP/OBS HIGH 75: CPT | Performed by: HOSPITALIST

## 2022-12-29 PROCEDURE — 80053 COMPREHEN METABOLIC PANEL: CPT | Performed by: EMERGENCY MEDICINE

## 2022-12-29 RX ORDER — RIMEGEPANT SULFATE 75 MG/75MG
75 TABLET, ORALLY DISINTEGRATING ORAL AS NEEDED
COMMUNITY
Start: 2022-12-15

## 2022-12-29 RX ORDER — PANTOPRAZOLE SODIUM 20 MG/1
20 TABLET, DELAYED RELEASE ORAL DAILY
COMMUNITY
Start: 2022-12-02

## 2022-12-29 RX ORDER — LISINOPRIL 10 MG/1
10 TABLET ORAL NIGHTLY
Status: DISCONTINUED | OUTPATIENT
Start: 2022-12-29 | End: 2022-12-30 | Stop reason: HOSPADM

## 2022-12-29 RX ORDER — B-COMPLEX WITH VITAMIN C
1 TABLET ORAL DAILY
COMMUNITY

## 2022-12-29 RX ORDER — BISACODYL 10 MG
10 SUPPOSITORY, RECTAL RECTAL DAILY PRN
Status: DISCONTINUED | OUTPATIENT
Start: 2022-12-29 | End: 2022-12-30 | Stop reason: HOSPADM

## 2022-12-29 RX ORDER — SODIUM CHLORIDE 0.9 % (FLUSH) 0.9 %
10 SYRINGE (ML) INJECTION EVERY 12 HOURS SCHEDULED
Status: DISCONTINUED | OUTPATIENT
Start: 2022-12-29 | End: 2022-12-30 | Stop reason: HOSPADM

## 2022-12-29 RX ORDER — FAMOTIDINE 20 MG/1
40 TABLET, FILM COATED ORAL DAILY
Status: DISCONTINUED | OUTPATIENT
Start: 2022-12-30 | End: 2022-12-30 | Stop reason: HOSPADM

## 2022-12-29 RX ORDER — BISACODYL 5 MG/1
5 TABLET, DELAYED RELEASE ORAL DAILY PRN
Status: DISCONTINUED | OUTPATIENT
Start: 2022-12-29 | End: 2022-12-30 | Stop reason: HOSPADM

## 2022-12-29 RX ORDER — CLONAZEPAM 0.5 MG/1
0.5 TABLET ORAL 2 TIMES DAILY PRN
Status: DISCONTINUED | OUTPATIENT
Start: 2022-12-29 | End: 2022-12-30 | Stop reason: HOSPADM

## 2022-12-29 RX ORDER — ATENOLOL 25 MG/1
25 TABLET ORAL DAILY
Status: DISCONTINUED | OUTPATIENT
Start: 2022-12-30 | End: 2022-12-30 | Stop reason: HOSPADM

## 2022-12-29 RX ORDER — CETIRIZINE HYDROCHLORIDE 10 MG/1
10 TABLET ORAL DAILY
Status: DISCONTINUED | OUTPATIENT
Start: 2022-12-30 | End: 2022-12-30 | Stop reason: HOSPADM

## 2022-12-29 RX ORDER — ENOXAPARIN SODIUM 100 MG/ML
40 INJECTION SUBCUTANEOUS DAILY
Status: DISCONTINUED | OUTPATIENT
Start: 2022-12-29 | End: 2022-12-30 | Stop reason: HOSPADM

## 2022-12-29 RX ORDER — SODIUM CHLORIDE 0.9 % (FLUSH) 0.9 %
10 SYRINGE (ML) INJECTION AS NEEDED
Status: DISCONTINUED | OUTPATIENT
Start: 2022-12-29 | End: 2022-12-29

## 2022-12-29 RX ORDER — LEVOTHYROXINE SODIUM 0.03 MG/1
25 TABLET ORAL DAILY
Status: DISCONTINUED | OUTPATIENT
Start: 2022-12-30 | End: 2022-12-30 | Stop reason: HOSPADM

## 2022-12-29 RX ORDER — ACETAMINOPHEN 160 MG/5ML
650 SOLUTION ORAL EVERY 4 HOURS PRN
Status: DISCONTINUED | OUTPATIENT
Start: 2022-12-29 | End: 2022-12-30 | Stop reason: HOSPADM

## 2022-12-29 RX ORDER — SODIUM CHLORIDE 0.9 % (FLUSH) 0.9 %
10 SYRINGE (ML) INJECTION AS NEEDED
Status: DISCONTINUED | OUTPATIENT
Start: 2022-12-29 | End: 2022-12-30 | Stop reason: HOSPADM

## 2022-12-29 RX ORDER — CLONAZEPAM 0.5 MG/1
1 TABLET ORAL 2 TIMES DAILY
Status: DISCONTINUED | OUTPATIENT
Start: 2022-12-29 | End: 2022-12-30 | Stop reason: HOSPADM

## 2022-12-29 RX ORDER — NARATRIPTAN 2.5 MG/1
2.5 TABLET ORAL AS NEEDED
COMMUNITY
Start: 2022-07-27

## 2022-12-29 RX ORDER — ROPINIROLE 0.25 MG/1
0.5 TABLET, FILM COATED ORAL NIGHTLY
Status: DISCONTINUED | OUTPATIENT
Start: 2022-12-29 | End: 2022-12-30 | Stop reason: HOSPADM

## 2022-12-29 RX ORDER — SODIUM CHLORIDE 9 MG/ML
125 INJECTION, SOLUTION INTRAVENOUS CONTINUOUS
Status: DISCONTINUED | OUTPATIENT
Start: 2022-12-29 | End: 2022-12-30 | Stop reason: HOSPADM

## 2022-12-29 RX ORDER — SODIUM CHLORIDE 9 MG/ML
40 INJECTION, SOLUTION INTRAVENOUS AS NEEDED
Status: DISCONTINUED | OUTPATIENT
Start: 2022-12-29 | End: 2022-12-30 | Stop reason: HOSPADM

## 2022-12-29 RX ORDER — ACETAMINOPHEN 500 MG
1000 TABLET ORAL 3 TIMES DAILY
Status: DISCONTINUED | OUTPATIENT
Start: 2022-12-29 | End: 2022-12-30 | Stop reason: HOSPADM

## 2022-12-29 RX ORDER — HYDROCODONE BITARTRATE AND ACETAMINOPHEN 7.5; 325 MG/1; MG/1
2 TABLET ORAL EVERY 4 HOURS PRN
Status: DISCONTINUED | OUTPATIENT
Start: 2022-12-29 | End: 2022-12-30 | Stop reason: HOSPADM

## 2022-12-29 RX ORDER — KETOROLAC TROMETHAMINE 30 MG/ML
30 INJECTION, SOLUTION INTRAMUSCULAR; INTRAVENOUS EVERY 6 HOURS PRN
Status: DISCONTINUED | OUTPATIENT
Start: 2022-12-29 | End: 2022-12-30 | Stop reason: HOSPADM

## 2022-12-29 RX ORDER — DESVENLAFAXINE 100 MG/1
3 TABLET, EXTENDED RELEASE ORAL DAILY
COMMUNITY
Start: 2022-12-07

## 2022-12-29 RX ORDER — PREDNISONE 10 MG/1
40 TABLET ORAL DAILY
Status: DISCONTINUED | OUTPATIENT
Start: 2022-12-30 | End: 2022-12-29

## 2022-12-29 RX ORDER — PANTOPRAZOLE SODIUM 40 MG/10ML
40 INJECTION, POWDER, LYOPHILIZED, FOR SOLUTION INTRAVENOUS
Status: DISCONTINUED | OUTPATIENT
Start: 2022-12-29 | End: 2022-12-30 | Stop reason: HOSPADM

## 2022-12-29 RX ORDER — HYDROCODONE BITARTRATE AND ACETAMINOPHEN 5; 325 MG/1; MG/1
1 TABLET ORAL EVERY 4 HOURS PRN
Status: DISCONTINUED | OUTPATIENT
Start: 2022-12-29 | End: 2022-12-30 | Stop reason: HOSPADM

## 2022-12-29 RX ORDER — ACETAMINOPHEN 650 MG/1
650 SUPPOSITORY RECTAL EVERY 4 HOURS PRN
Status: DISCONTINUED | OUTPATIENT
Start: 2022-12-29 | End: 2022-12-30 | Stop reason: HOSPADM

## 2022-12-29 RX ORDER — SUMATRIPTAN 25 MG/1
25 TABLET, FILM COATED ORAL ONCE
Status: DISCONTINUED | OUTPATIENT
Start: 2022-12-29 | End: 2022-12-29

## 2022-12-29 RX ORDER — TERAZOSIN 1 MG/1
1 CAPSULE ORAL NIGHTLY
Status: DISCONTINUED | OUTPATIENT
Start: 2022-12-29 | End: 2022-12-30 | Stop reason: HOSPADM

## 2022-12-29 RX ORDER — DOXEPIN HYDROCHLORIDE 50 MG/1
100 CAPSULE ORAL NIGHTLY
Status: DISCONTINUED | OUTPATIENT
Start: 2022-12-29 | End: 2022-12-30 | Stop reason: HOSPADM

## 2022-12-29 RX ORDER — NALOXONE HCL 0.4 MG/ML
0.4 VIAL (ML) INJECTION
Status: DISCONTINUED | OUTPATIENT
Start: 2022-12-29 | End: 2022-12-30 | Stop reason: HOSPADM

## 2022-12-29 RX ORDER — ACETAMINOPHEN 325 MG/1
650 TABLET ORAL EVERY 4 HOURS PRN
Status: DISCONTINUED | OUTPATIENT
Start: 2022-12-29 | End: 2022-12-30 | Stop reason: HOSPADM

## 2022-12-29 RX ORDER — PRAZOSIN HYDROCHLORIDE 1 MG/1
1-2 CAPSULE ORAL NIGHTLY
COMMUNITY
Start: 2022-12-02

## 2022-12-29 RX ORDER — DOXEPIN HYDROCHLORIDE 100 MG/1
100 CAPSULE ORAL
COMMUNITY
Start: 2022-11-28

## 2022-12-29 RX ORDER — ONDANSETRON 2 MG/ML
4 INJECTION INTRAMUSCULAR; INTRAVENOUS ONCE
Status: DISCONTINUED | OUTPATIENT
Start: 2022-12-29 | End: 2022-12-29 | Stop reason: SDUPTHER

## 2022-12-29 RX ORDER — ONABOTULINUMTOXINA 100 [USP'U]/1
100 INJECTION, POWDER, LYOPHILIZED, FOR SOLUTION INTRADERMAL; INTRAMUSCULAR TAKE AS DIRECTED
COMMUNITY
End: 2022-12-29

## 2022-12-29 RX ORDER — NITROGLYCERIN 0.4 MG/1
0.4 TABLET SUBLINGUAL
Status: DISCONTINUED | OUTPATIENT
Start: 2022-12-29 | End: 2022-12-30 | Stop reason: HOSPADM

## 2022-12-29 RX ORDER — POLYETHYLENE GLYCOL 3350 17 G/17G
17 POWDER, FOR SOLUTION ORAL DAILY PRN
Status: DISCONTINUED | OUTPATIENT
Start: 2022-12-29 | End: 2022-12-30 | Stop reason: HOSPADM

## 2022-12-29 RX ORDER — CHOLECALCIFEROL (VITAMIN D3) 125 MCG
5 CAPSULE ORAL NIGHTLY PRN
Status: DISCONTINUED | OUTPATIENT
Start: 2022-12-29 | End: 2022-12-30 | Stop reason: HOSPADM

## 2022-12-29 RX ORDER — AMOXICILLIN 250 MG
2 CAPSULE ORAL 2 TIMES DAILY
Status: DISCONTINUED | OUTPATIENT
Start: 2022-12-29 | End: 2022-12-30 | Stop reason: HOSPADM

## 2022-12-29 RX ORDER — ONDANSETRON 2 MG/ML
4 INJECTION INTRAMUSCULAR; INTRAVENOUS ONCE
Status: COMPLETED | OUTPATIENT
Start: 2022-12-29 | End: 2022-12-29

## 2022-12-29 RX ORDER — CLONAZEPAM 1 MG/1
1 TABLET ORAL 2 TIMES DAILY
COMMUNITY
Start: 2022-12-02

## 2022-12-29 RX ORDER — ONDANSETRON 2 MG/ML
4 INJECTION INTRAMUSCULAR; INTRAVENOUS EVERY 6 HOURS PRN
Status: DISCONTINUED | OUTPATIENT
Start: 2022-12-29 | End: 2022-12-30 | Stop reason: HOSPADM

## 2022-12-29 RX ORDER — DOXEPIN HYDROCHLORIDE 25 MG/1
25 CAPSULE ORAL NIGHTLY PRN
Status: DISCONTINUED | OUTPATIENT
Start: 2022-12-29 | End: 2022-12-30 | Stop reason: HOSPADM

## 2022-12-29 RX ORDER — ONDANSETRON 4 MG/1
4 TABLET, FILM COATED ORAL EVERY 6 HOURS PRN
Status: DISCONTINUED | OUTPATIENT
Start: 2022-12-29 | End: 2022-12-30 | Stop reason: HOSPADM

## 2022-12-29 RX ADMIN — IOPAMIDOL 100 ML: 755 INJECTION, SOLUTION INTRAVENOUS at 20:22

## 2022-12-29 RX ADMIN — SODIUM CHLORIDE 1000 ML: 9 INJECTION, SOLUTION INTRAVENOUS at 17:46

## 2022-12-29 RX ADMIN — HYDROMORPHONE HYDROCHLORIDE 1 MG: 1 INJECTION, SOLUTION INTRAMUSCULAR; INTRAVENOUS; SUBCUTANEOUS at 22:18

## 2022-12-29 RX ADMIN — ONDANSETRON 4 MG: 2 INJECTION INTRAMUSCULAR; INTRAVENOUS at 17:48

## 2022-12-29 RX ADMIN — SODIUM CHLORIDE 1000 ML: 9 INJECTION, SOLUTION INTRAVENOUS at 19:28

## 2022-12-29 RX ADMIN — HYDROMORPHONE HYDROCHLORIDE 1 MG: 1 INJECTION, SOLUTION INTRAMUSCULAR; INTRAVENOUS; SUBCUTANEOUS at 17:54

## 2022-12-29 NOTE — ED PROVIDER NOTES
Time: 5:18 PM EST  Chief Complaint:   Chief Complaint   Patient presents with   • Abdominal Pain           History of Present Illness:  Patient is a 52 y.o. year old female who presents to the emergency department with epigastric abdominal pain.  Patient states she was diagnosed with pancreatitis last week.  Patient states she was hospitalized at [facility] for this diagnosis and was released home.  Patient states she was directed if pain worsen to come to [facility] because her normal doctor, Dr. Jara is here.  Patient admits to persistent and worsening abdominal pain.  Patient denies nausea or vomiting.          Pt has been having worsening epigastric abdominal pain. Pt was diagnosed with pancreatitis a few weeks ago. Pt was hospitalized on 22. Pt's gallbladder and kidney's were checked during this hospital stay and nothing was found.    Pt does not have RA.  Pt is having diarrhea and emesis. Pt denies dysuria.      History provided by:  Patient   used: No    Abdominal Pain  Pain location:  Epigastric  Pain radiates to:  Does not radiate  Progression:  Worsening  Associated symptoms: diarrhea, nausea and vomiting    Associated symptoms: no chest pain, no chills, no cough, no dysuria, no fever, no shortness of breath and no sore throat            Patient Care Team  Primary Care Provider: Daniel Monaco APRN    Past Medical History:     Allergies   Allergen Reactions   • Penicillins    • Iodides Hives     Past Medical History:   Diagnosis Date   • Depression    • Hypertension    • Migraine    • Restless leg      Past Surgical History:   Procedure Laterality Date   • ADENOIDECTOMY     •  SECTION     • ENDOSCOPY N/A 2022    Procedure: ESOPHAGOGASTRODUODENOSCOPY WITH BIOPSIES;  Surgeon: Radha Jara MD;  Location: [facility] ENDOSCOPY;  Service: Gastroenterology;  Laterality: N/A;  HIATAL HERNIA, GASTRITIS, GASTRIC POLYP   • INNER EAR SURGERY     • POSTPARTUM  TUBAL LIGATION  04/01/1998   • TONSILLECTOMY       Family History   Problem Relation Age of Onset   • Heart disease Mother    • Cancer Father         prostate colon    • COPD Father    • Thyroid disease Neg Hx        Home Medications:  Prior to Admission medications    Medication Sig Start Date End Date Taking? Authorizing Provider   atenolol (TENORMIN) 25 MG tablet Take 25 mg by mouth Daily.    Tiffanie Elizalde MD   Biotin 5 MG tablet Take  by mouth.    Tiffanie Elizalde MD   Cholecalciferol (Vitamin D3) 50 MCG (2000 UT) tablet Take  by mouth.    Tiffanie Elizalde MD   clonazePAM (KlonoPIN) 0.5 MG tablet Take 1 tablet by mouth 2 (two) times a day as needed. 6/18/14   Tiffanie Elizalde MD   desvenlafaxine (PRISTIQ) 50 MG 24 hr tablet Take 1 tablet by mouth Daily. Indications: Major Depressive Disorder 6/23/20   Luis Alfredo Hidalgo III, MD   doxepin (SINEquan) 25 MG capsule Take 1 capsule by mouth At Night As Needed for Sleep (anxiety). Indications: sleep 6/22/20   Luis Alfredo Hidalgo III, MD   fexofenadine (ALLEGRA) 180 MG tablet Take 180 mg by mouth Daily.    Tiffanie Elizalde MD   levothyroxine (SYNTHROID, LEVOTHROID) 25 MCG tablet Take 25 mcg by mouth Daily.    Tiffanie Elizalde MD   lisinopril (PRINIVIL,ZESTRIL) 10 MG tablet Take 10 mg by mouth Every Night.    Tiffanie Elizalde MD   lurasidone (LATUDA) 20 MG tablet tablet Take 1 tablet by mouth Daily. Indications: Depressive Phase of Manic-Depression 6/22/20   Luis Alfredo Hidalgo III, MD   omeprazole (priLOSEC) 20 MG capsule Take 20 mg by mouth Every Night.    Tiffanie Elizalde MD   OnabotulinumtoxinA (BOTOX IM) Inject  into the appropriate muscle as directed by prescriber.    Tiffanie Elizalde MD   Rimegepant Sulfate (NURTEC PO) Take  by mouth.    Tiffanie Elizalde MD   rOPINIRole (REQUIP) 0.25 MG tablet Take 0.5 mg by mouth Every Night. Take 1 hour before bedtime, take 2 tabs nightly   Indications:  "Restless Leg Syndrome    Provider, MD Tiffanie   vitamin B-12 (CYANOCOBALAMIN) 100 MCG tablet Take 50 mcg by mouth Daily.    Provider, MD Tiffanie        Social History:   Social History     Tobacco Use   • Smoking status: Never   • Smokeless tobacco: Never   Vaping Use   • Vaping Use: Never used   Substance Use Topics   • Alcohol use: No   • Drug use: No         Review of Systems:  Review of Systems   Constitutional: Negative for chills and fever.   HENT: Negative for congestion, rhinorrhea and sore throat.    Eyes: Negative for pain and visual disturbance.   Respiratory: Negative for apnea, cough, chest tightness and shortness of breath.    Cardiovascular: Negative for chest pain and palpitations.   Gastrointestinal: Positive for abdominal pain, diarrhea, nausea and vomiting.   Genitourinary: Negative for difficulty urinating and dysuria.   Musculoskeletal: Negative for joint swelling and myalgias.   Skin: Negative for color change.   Neurological: Negative for seizures and headaches.   Psychiatric/Behavioral: Negative.    All other systems reviewed and are negative.       Physical Exam:  /77   Pulse 77   Temp 97.9 °F (36.6 °C) (Oral)   Resp 16   Ht 160 cm (63\")   Wt 106 kg (233 lb 14.5 oz)   SpO2 92%   BMI 41.43 kg/m²     Physical Exam  Vitals and nursing note reviewed.   Constitutional:       General: She is not in acute distress.     Appearance: Normal appearance. She is not toxic-appearing.   HENT:      Head: Normocephalic and atraumatic.      Jaw: There is normal jaw occlusion.   Eyes:      General: Lids are normal.      Extraocular Movements: Extraocular movements intact.      Conjunctiva/sclera: Conjunctivae normal.      Pupils: Pupils are equal, round, and reactive to light.   Cardiovascular:      Rate and Rhythm: Normal rate and regular rhythm.      Pulses: Normal pulses.      Heart sounds: Normal heart sounds.   Pulmonary:      Effort: Pulmonary effort is normal. No respiratory " distress.      Breath sounds: Normal breath sounds. No wheezing or rhonchi.   Abdominal:      General: Abdomen is flat.      Palpations: Abdomen is soft.      Tenderness: There is abdominal tenderness in the epigastric area. There is no guarding or rebound.   Musculoskeletal:         General: Normal range of motion.      Cervical back: Normal range of motion and neck supple.      Right lower leg: No edema.      Left lower leg: No edema.   Skin:     General: Skin is warm and dry.   Neurological:      Mental Status: She is alert and oriented to person, place, and time. Mental status is at baseline.   Psychiatric:         Mood and Affect: Mood normal.                Medications in the Emergency Department:  Medications   nitroglycerin (NITROSTAT) SL tablet 0.4 mg (has no administration in time range)   sodium chloride 0.9 % flush 10 mL (has no administration in time range)   sodium chloride 0.9 % flush 10 mL (has no administration in time range)   sodium chloride 0.9 % infusion 40 mL (has no administration in time range)   Enoxaparin Sodium (LOVENOX) syringe 40 mg (has no administration in time range)   sodium chloride 0.9 % infusion (has no administration in time range)   acetaminophen (TYLENOL) tablet 1,000 mg (has no administration in time range)   acetaminophen (TYLENOL) tablet 650 mg (has no administration in time range)     Or   acetaminophen (TYLENOL) 160 MG/5ML solution 650 mg (has no administration in time range)     Or   acetaminophen (TYLENOL) suppository 650 mg (has no administration in time range)   HYDROcodone-acetaminophen (NORCO) 5-325 MG per tablet 1 tablet (has no administration in time range)   ketorolac (TORADOL) injection 30 mg (has no administration in time range)   HYDROcodone-acetaminophen (NORCO) 7.5-325 MG per tablet 2 tablet (has no administration in time range)   HYDROmorphone (DILAUDID) injection 0.5 mg (has no administration in time range)     And   naloxone (NARCAN) injection 0.4 mg (has  no administration in time range)   melatonin tablet 5 mg (has no administration in time range)   sennosides-docusate (PERICOLACE) 8.6-50 MG per tablet 2 tablet (has no administration in time range)     And   polyethylene glycol (MIRALAX) packet 17 g (has no administration in time range)     And   bisacodyl (DULCOLAX) EC tablet 5 mg (has no administration in time range)     And   bisacodyl (DULCOLAX) suppository 10 mg (has no administration in time range)   ondansetron (ZOFRAN) tablet 4 mg (has no administration in time range)     Or   ondansetron (ZOFRAN) injection 4 mg (has no administration in time range)   famotidine (PEPCID) tablet 40 mg (has no administration in time range)   doxepin (SINEquan) capsule 25 mg (has no administration in time range)   atenolol (TENORMIN) tablet 25 mg (has no administration in time range)   clonazePAM (KlonoPIN) tablet 0.5 mg (has no administration in time range)   cetirizine (zyrTEC) tablet 10 mg (has no administration in time range)   levothyroxine (SYNTHROID, LEVOTHROID) tablet 25 mcg (has no administration in time range)   lisinopril (PRINIVIL,ZESTRIL) tablet 10 mg (has no administration in time range)   rOPINIRole (REQUIP) tablet 0.5 mg (has no administration in time range)   pantoprazole (PROTONIX) injection 40 mg (has no administration in time range)   clonazePAM (KlonoPIN) tablet 1 mg (has no administration in time range)   doxepin (SINEquan) capsule 100 mg (has no administration in time range)   terazosin (HYTRIN) capsule 1 mg (has no administration in time range)   sodium chloride 0.9 % bolus 1,000 mL (0 mL Intravenous Stopped 12/29/22 1928)   sodium chloride 0.9 % bolus 1,000 mL (0 mL Intravenous Stopped 12/29/22 2325)   ondansetron (ZOFRAN) injection 4 mg (4 mg Intravenous Given 12/29/22 2318)   HYDROmorphone (DILAUDID) injection 1 mg (1 mg Intravenous Given 12/29/22 3784)   iopamidol (ISOVUE-370) 76 % injection 100 mL (100 mL Intravenous Given 12/29/22 2022)    HYDROmorphone (DILAUDID) injection 1 mg (1 mg Intravenous Given 12/29/22 2218)        Labs  Lab Results (last 24 hours)     Procedure Component Value Units Date/Time    CBC & Differential [[id number]]  (Abnormal) Collected: 12/29/22 1727    Specimen: Blood Updated: 12/29/22 1734    Narrative:      The following orders were created for panel order CBC & Differential.  Procedure                               Abnormality         Status                     ---------                               -----------         ------                     CBC Auto Differential[[id number]]        Abnormal            Final result                 Please view results for these tests on the individual orders.    Comprehensive Metabolic Panel [[id number]] Collected: 12/29/22 1727    Specimen: Blood Updated: 12/29/22 1750     Glucose 88 mg/dL      BUN 11 mg/dL      Creatinine 0.79 mg/dL      Sodium 138 mmol/L      Potassium 4.1 mmol/L      Chloride 104 mmol/L      CO2 23.7 mmol/L      Calcium 9.7 mg/dL      Total Protein 7.1 g/dL      Albumin 4.3 g/dL      ALT (SGPT) 18 U/L      AST (SGOT) 14 U/L      Alkaline Phosphatase 92 U/L      Total Bilirubin 0.2 mg/dL      Globulin 2.8 gm/dL      A/G Ratio 1.5 g/dL      BUN/Creatinine Ratio 13.9     Anion Gap 10.3 mmol/L      eGFR 91.3 mL/min/1.73      Comment: National Kidney Foundation and American Society of Nephrology (ASN) Task Force recommended calculation based on the Chronic Kidney Disease Epidemiology Collaboration (CKD-EPI) equation refit without adjustment for race.       Narrative:      GFR Normal >60  Chronic Kidney Disease <60  Kidney Failure <15      Lipase [[id number]]  (Normal) Collected: 12/29/22 1727    Specimen: Blood Updated: 12/29/22 1750     Lipase 33 U/L     hCG, Quantitative, Pregnancy [[id number]] Collected: 12/29/22 1727    Specimen: Blood Updated: 12/29/22 1757     HCG Quantitative <0.50 mIU/mL     Narrative:      HCG Ranges by Gestational Age    Females - non-pregnant  premenopausal   </= 1mIU/mL HCG  Females - postmenopausal               </= 7mIU/mL HCG    3 Weeks       5.4   -      72 mIU/mL  4 Weeks      10.2   -     708 mIU/mL  5 Weeks       217   -   8,245 mIU/mL  6 Weeks       152   -  32,177 mIU/mL  7 Weeks     4,059   - 153,767 mIU/mL  8 Weeks    31,366   - 149,094 mIU/mL  9 Weeks    59,109   - 135,901 mIU/mL  10 Weeks   44,186   - 170,409 mIU/mL  12 Weeks   27,107   - 201,615 mIU/mL  14 Weeks   24,302   -  93,646 mIU/mL  15 Weeks   12,540   -  69,747 mIU/mL  16 Weeks    8,904   -  55,332 mIU/mL  17 Weeks    8,240   -  51,793 mIU/mL  18 Weeks    9,649   -  55,271 mIU/mL    Results may be falsely decreased if patient taking Biotin.      Lactic Acid, Plasma [[id number]]  (Normal) Collected: 12/29/22 1727    Specimen: Blood Updated: 12/29/22 1747     Lactate 1.3 mmol/L     CBC Auto Differential [[id number]]  (Abnormal) Collected: 12/29/22 1727    Specimen: Blood Updated: 12/29/22 1734     WBC 6.78 10*3/mm3      RBC 4.11 10*6/mm3      Hemoglobin 11.7 g/dL      Hematocrit 37.1 %      MCV 90.3 fL      MCH 28.5 pg      MCHC 31.5 g/dL      RDW 14.6 %      RDW-SD 47.5 fl      MPV 9.4 fL      Platelets 251 10*3/mm3      Neutrophil % 66.9 %      Lymphocyte % 24.5 %      Monocyte % 7.7 %      Eosinophil % 0.1 %      Basophil % 0.1 %      Immature Grans % 0.7 %      Neutrophils, Absolute 4.53 10*3/mm3      Lymphocytes, Absolute 1.66 10*3/mm3      Monocytes, Absolute 0.52 10*3/mm3      Eosinophils, Absolute 0.01 10*3/mm3      Basophils, Absolute 0.01 10*3/mm3      Immature Grans, Absolute 0.05 10*3/mm3      nRBC 0.0 /100 WBC     Urinalysis With Microscopic If Indicated (No Culture) - Urine, Clean Catch [[id number]]  (Normal) Collected: 12/29/22 1739    Specimen: Urine, Clean Catch Updated: 12/29/22 1750     Color, UA Yellow     Appearance, UA Clear     pH, UA 6.0     Specific Gravity, UA 1.010     Glucose, UA Negative     Ketones, UA Negative     Bilirubin, UA Negative     Blood, UA  Negative     Protein, UA Negative     Leuk Esterase, UA Negative     Nitrite, UA Negative     Urobilinogen, UA 0.2 E.U./dL    Narrative:      Urine microscopic not indicated.           Imaging:  CT Abdomen Pelvis With Contrast    Result Date: 12/29/2022  PROCEDURE: CT ABDOMEN PELVIS W CONTRAST  COMPARISON: None  INDICATIONS: epigastric abdominal pain, pancreatitis  TECHNIQUE: After obtaining the patient's consent, CT images were created with non-ionic intravenous contrast material.   PROTOCOL:   Standard imaging protocol performed    RADIATION:   DLP: 1459.9mGy*cm   Automated exposure control was utilized to minimize radiation dose. CONTRAST: 100cc Isovue 370 I.V.  FINDINGS:  There is some hepatic steatosis.  There is a small hiatal hernia.  The spleen is slightly prominent size measuring 15 cm in AP dimension.  There is a little bit indistinctness to the head of the pancreas.  Some mild pancreatitis could not be excluded.  There is no definite renal abnormality.  There is enhancement around the cystic area in the left ovary which measures 1.25 cm that could relate to involuting cyst.  There is some free fluid in the pelvis which could be physiologic.  There is suggested nabothian cyst in the lower uterine segment.  This measures 0.83 cm.  There is some lobulation to the uterus which could relate to fibroid change.  An acute bowel abnormality not definitely confirmed.         1. There is some stranding in the fat around the head of the pancreas that could relate to some early pancreatitis. 2. Hepatic steatosis. 3. Suggested involuting cyst left ovary.  There is some free fluid within the pelvis which could be physiologic. 4. Lobular contour to the uterus which could relate to fibroid change. 5. Evidence for some splenomegaly.     EFRA THOMAS MD       Electronically Signed and Approved By: EFRA THOMAS MD on 12/29/2022 at 20:40               Procedures:  Procedures    Progress                            The patient  was initially evaluated in the triage area where orders were placed. The patient was later dispositioned by Segundo Andre MD.      The patient was advised to stay for completion of workup which includes but is not limited to communication of labs and radiological results, reassessment and plan. The patient was advised that leaving prior to disposition by a provider could result in critical findings that are not communicated to the patient.     Medical Decision Making:  MDM  Number of Diagnoses or Management Options  Acute pancreatitis, unspecified complication status, unspecified pancreatitis type  Diagnosis management comments: The patient´s CBC that was reviewed and interpreted by me shows no abnormalities of critical concern. Of note, there is no anemia requiring a blood transfusion and the platelet count is acceptable.  The patient´s CMP that was reviewed and interpretted by me shows no abnormalities of critical concern. Of note, the patient´s sodium and potassium are acceptable. The patient´s liver enzymes are unremarkable. The patient´s renal function (creatinine) is preserved. The patient has a normal anion gap.  Lactic acid negative for bacteriuria.  CT scan abdomen pelvis is consistent with acute pancreatitis.  Case was discussed with Dr. Driscoll who agrees with admission.       Amount and/or Complexity of Data Reviewed  Clinical lab tests: ordered and reviewed  Tests in the medicine section of CPT®: ordered and reviewed  Discussion of test results with the performing providers: yes  Discuss the patient with other providers: yes  Independent visualization of images, tracings, or specimens: yes    Risk of Complications, Morbidity, and/or Mortality  Presenting problems: moderate  Management options: moderate             The following orders were placed after triage and evaluation:  Orders Placed This Encounter   Procedures   • CT Abdomen Pelvis With Contrast   • [place] Draw   • Comprehensive Metabolic  Panel   • Lipase   • Urinalysis With Microscopic If Indicated (No Culture) - Urine, Clean Catch   • hCG, Quantitative, Pregnancy   • Lactic Acid, Plasma   • CBC Auto Differential   • Potassium   • Magnesium   • Troponin   • Blood Gas, Arterial -With Co-Ox Panel: Yes   • Basic Metabolic Panel   • Magnesium   • Phosphorus   • CBC Auto Differential   • IgG 4   • Diet: Liquid Diets; Clear Liquid; Texture: Regular Texture (IDDSI 7); Fluid Consistency: Thin (IDDSI 0)   • Undress & Gown   • Vital Signs   • Telemetry - Pulse Oximetry   • Notify Provider if ACLS Protocol Activated   • Intake & Output   • Weigh Patient   • Oral Care   • Saline Lock & Maintain IV Access   • Discontinue Cardiac Monitoring   • Code Status and Medical Interventions:   • Inpatient Hospitalist Consult   • Inpatient Gastroenterology Consult   • Oxygen Therapy- Nasal Cannula; Titrate for SPO2: 90% - 95%   • ECG 12 Lead Chest Pain   • Insert Peripheral IV   • Insert Peripheral IV   • Inpatient Admission   • CBC & Differential   • Green Top (Gel)   • Lavender Top   • Gold Top - SST   • Light Blue Top   • CBC & Differential       Final diagnoses:   Acute pancreatitis, unspecified complication status, unspecified pancreatitis type          Disposition:  ED Disposition     ED Disposition   Decision to Admit    Condition   --    Comment   Level of Care: Med/Surg [1]   Diagnosis: Pancreatitis [202663]   Admitting Physician: BUZZ LORENZO [[id number]]   Attending Physician: BUZZ LORENZO [[id number]]   Certification: I Certify That Inpatient Hospital Services Are Medically Necessary For Greater Than 2 Midnights               This medical record created using voice recognition software.    Documentation assistance provided by Joey Antonio acting as scribe for Segundo Andre MD. Information recorded by the scribe was done at my direction and has been verified and validated by me.          Joey Antonio  12/29/22 1744       Segundo Andre MD  12/30/22  0057

## 2022-12-30 ENCOUNTER — TELEPHONE (OUTPATIENT)
Dept: GASTROENTEROLOGY | Facility: CLINIC | Age: 52
End: 2022-12-30

## 2022-12-30 VITALS
TEMPERATURE: 97.2 F | BODY MASS INDEX: 40.82 KG/M2 | OXYGEN SATURATION: 100 % | DIASTOLIC BLOOD PRESSURE: 77 MMHG | WEIGHT: 230.38 LBS | HEART RATE: 82 BPM | RESPIRATION RATE: 16 BRPM | SYSTOLIC BLOOD PRESSURE: 133 MMHG | HEIGHT: 63 IN

## 2022-12-30 DIAGNOSIS — K86.1 AUTOIMMUNE PANCREATITIS: Primary | ICD-10-CM

## 2022-12-30 LAB
ANION GAP SERPL CALCULATED.3IONS-SCNC: 8 MMOL/L (ref 5–15)
BASOPHILS # BLD AUTO: 0.01 10*3/MM3 (ref 0–0.2)
BASOPHILS NFR BLD AUTO: 0.1 % (ref 0–1.5)
BUN SERPL-MCNC: 9 MG/DL (ref 6–20)
BUN/CREAT SERPL: 11.4 (ref 7–25)
CALCIUM SPEC-SCNC: 8.5 MG/DL (ref 8.6–10.5)
CHLORIDE SERPL-SCNC: 104 MMOL/L (ref 98–107)
CO2 SERPL-SCNC: 22 MMOL/L (ref 22–29)
CREAT SERPL-MCNC: 0.79 MG/DL (ref 0.57–1)
DEPRECATED RDW RBC AUTO: 48 FL (ref 37–54)
EGFRCR SERPLBLD CKD-EPI 2021: 91.3 ML/MIN/1.73
EOSINOPHIL # BLD AUTO: 0 10*3/MM3 (ref 0–0.4)
EOSINOPHIL NFR BLD AUTO: 0 % (ref 0.3–6.2)
ERYTHROCYTE [DISTWIDTH] IN BLOOD BY AUTOMATED COUNT: 14.6 % (ref 12.3–15.4)
GLUCOSE SERPL-MCNC: 120 MG/DL (ref 65–99)
HCT VFR BLD AUTO: 33.8 % (ref 34–46.6)
HGB BLD-MCNC: 10.7 G/DL (ref 12–15.9)
IMM GRANULOCYTES # BLD AUTO: 0.06 10*3/MM3 (ref 0–0.05)
IMM GRANULOCYTES NFR BLD AUTO: 0.8 % (ref 0–0.5)
LYMPHOCYTES # BLD AUTO: 1.5 10*3/MM3 (ref 0.7–3.1)
LYMPHOCYTES NFR BLD AUTO: 21 % (ref 19.6–45.3)
MAGNESIUM SERPL-MCNC: 1.7 MG/DL (ref 1.6–2.6)
MCH RBC QN AUTO: 28.7 PG (ref 26.6–33)
MCHC RBC AUTO-ENTMCNC: 31.7 G/DL (ref 31.5–35.7)
MCV RBC AUTO: 90.6 FL (ref 79–97)
MONOCYTES # BLD AUTO: 0.49 10*3/MM3 (ref 0.1–0.9)
MONOCYTES NFR BLD AUTO: 6.9 % (ref 5–12)
NEUTROPHILS NFR BLD AUTO: 5.09 10*3/MM3 (ref 1.7–7)
NEUTROPHILS NFR BLD AUTO: 71.2 % (ref 42.7–76)
NRBC BLD AUTO-RTO: 0 /100 WBC (ref 0–0.2)
PHOSPHATE SERPL-MCNC: 3.2 MG/DL (ref 2.5–4.5)
PLATELET # BLD AUTO: 214 10*3/MM3 (ref 140–450)
PMV BLD AUTO: 9.2 FL (ref 6–12)
POTASSIUM SERPL-SCNC: 5 MMOL/L (ref 3.5–5.2)
RBC # BLD AUTO: 3.73 10*6/MM3 (ref 3.77–5.28)
SODIUM SERPL-SCNC: 134 MMOL/L (ref 136–145)
WBC NRBC COR # BLD: 7.15 10*3/MM3 (ref 3.4–10.8)

## 2022-12-30 PROCEDURE — 82787 IGG 1 2 3 OR 4 EACH: CPT | Performed by: HOSPITALIST

## 2022-12-30 PROCEDURE — 25010000002 ONDANSETRON PER 1 MG: Performed by: HOSPITALIST

## 2022-12-30 PROCEDURE — 80048 BASIC METABOLIC PNL TOTAL CA: CPT | Performed by: HOSPITALIST

## 2022-12-30 PROCEDURE — 25010000002 HYDROMORPHONE 1 MG/ML SOLUTION: Performed by: HOSPITALIST

## 2022-12-30 PROCEDURE — 83735 ASSAY OF MAGNESIUM: CPT | Performed by: HOSPITALIST

## 2022-12-30 PROCEDURE — 99239 HOSP IP/OBS DSCHRG MGMT >30: CPT | Performed by: INTERNAL MEDICINE

## 2022-12-30 PROCEDURE — 84100 ASSAY OF PHOSPHORUS: CPT | Performed by: HOSPITALIST

## 2022-12-30 PROCEDURE — 85025 COMPLETE CBC W/AUTO DIFF WBC: CPT | Performed by: HOSPITALIST

## 2022-12-30 PROCEDURE — 25010000002 ENOXAPARIN PER 10 MG: Performed by: HOSPITALIST

## 2022-12-30 RX ORDER — HYDROCODONE BITARTRATE AND ACETAMINOPHEN 5; 325 MG/1; MG/1
1 TABLET ORAL EVERY 4 HOURS PRN
Qty: 18 TABLET | Refills: 0 | Status: SHIPPED | OUTPATIENT
Start: 2022-12-30 | End: 2023-01-05

## 2022-12-30 RX ADMIN — Medication: at 08:29

## 2022-12-30 RX ADMIN — ENOXAPARIN SODIUM 40 MG: 40 INJECTION SUBCUTANEOUS at 01:37

## 2022-12-30 RX ADMIN — ACETAMINOPHEN 1000 MG: 500 TABLET ORAL at 01:35

## 2022-12-30 RX ADMIN — SENNOSIDES AND DOCUSATE SODIUM 2 TABLET: 8.6; 5 TABLET ORAL at 01:35

## 2022-12-30 RX ADMIN — Medication 10 ML: at 01:00

## 2022-12-30 RX ADMIN — HYDROMORPHONE HYDROCHLORIDE 0.5 MG: 1 INJECTION, SOLUTION INTRAMUSCULAR; INTRAVENOUS; SUBCUTANEOUS at 13:00

## 2022-12-30 RX ADMIN — ACETAMINOPHEN 1000 MG: 500 TABLET ORAL at 15:43

## 2022-12-30 RX ADMIN — HYDROMORPHONE HYDROCHLORIDE 0.5 MG: 1 INJECTION, SOLUTION INTRAMUSCULAR; INTRAVENOUS; SUBCUTANEOUS at 01:36

## 2022-12-30 RX ADMIN — ROPINIROLE HYDROCHLORIDE 0.5 MG: 0.25 TABLET, FILM COATED ORAL at 02:05

## 2022-12-30 RX ADMIN — FAMOTIDINE 40 MG: 20 TABLET, FILM COATED ORAL at 08:25

## 2022-12-30 RX ADMIN — SENNOSIDES AND DOCUSATE SODIUM 2 TABLET: 8.6; 5 TABLET ORAL at 08:25

## 2022-12-30 RX ADMIN — CLONAZEPAM 1 MG: 0.5 TABLET ORAL at 08:24

## 2022-12-30 RX ADMIN — HYDROMORPHONE HYDROCHLORIDE 0.5 MG: 1 INJECTION, SOLUTION INTRAMUSCULAR; INTRAVENOUS; SUBCUTANEOUS at 17:53

## 2022-12-30 RX ADMIN — SODIUM CHLORIDE 125 ML/HR: 9 INJECTION, SOLUTION INTRAVENOUS at 00:59

## 2022-12-30 RX ADMIN — ONDANSETRON 4 MG: 2 INJECTION INTRAMUSCULAR; INTRAVENOUS at 01:36

## 2022-12-30 RX ADMIN — PANTOPRAZOLE SODIUM 40 MG: 40 INJECTION, POWDER, FOR SOLUTION INTRAVENOUS at 01:36

## 2022-12-30 RX ADMIN — CETIRIZINE HYDROCHLORIDE 10 MG: 10 TABLET, FILM COATED ORAL at 08:25

## 2022-12-30 RX ADMIN — ATENOLOL 25 MG: 25 TABLET ORAL at 09:18

## 2022-12-30 RX ADMIN — CLONAZEPAM 1 MG: 0.5 TABLET ORAL at 01:36

## 2022-12-30 RX ADMIN — ACETAMINOPHEN 1000 MG: 500 TABLET ORAL at 08:25

## 2022-12-30 RX ADMIN — LEVOTHYROXINE SODIUM 25 MCG: 0.03 TABLET ORAL at 09:18

## 2022-12-30 RX ADMIN — TERAZOSIN HYDROCHLORIDE 1 MG: 1 CAPSULE ORAL at 02:06

## 2022-12-30 RX ADMIN — SODIUM CHLORIDE 125 ML/HR: 9 INJECTION, SOLUTION INTRAVENOUS at 09:12

## 2022-12-30 RX ADMIN — Medication 10 ML: at 08:23

## 2022-12-30 NOTE — DISCHARGE SUMMARY
[facility]         HOSPITALIST  DISCHARGE SUMMARY    Patient Name: Silvia Guillory  : 1970  MRN: [id number]    Date of Admission: 2022  Date of Discharge: 2022  Primary Care Physician: Daniel Monaco APRN    Consults     Date and Time Order Name Status Description    2022 11:16 PM Inpatient Gastroenterology Consult Completed     2022  9:10 PM Inpatient Hospitalist Consult            Active and Resolved Hospital Problems:  Active Hospital Problems    Diagnosis POA   • **Pancreatitis [K85.90] Yes      Resolved Hospital Problems   No resolved problems to display.     Autoimmune pancreatitis ?  Hypertension  Allergic rhinitis  GERD  Hypothyroidism  Depression    Hospital Course     Hospital Course:  Silvia Guillory is a 52 y.o. female  who presents to the emergency room with epigastric abdominal pain.  She was diagnosed with pancreatitis last week.  She was instructed by Dr. Jara to come to the hospital if the pain got worse.  She admits worsening abdominal pain.  She denies any nausea and vomiting.     On arrival to the ED, patient is a pressure is 97.9, pulse is 94, respiratory rate is 18, blood pressure is 141/83, patient is 100% on room air.  CT shows stranding of fat around the head of the pancreas likely related to early pancreatitis.  Hepatic steatosis.  Suggested involuting cyst of the left ovary.  There is some free fluid in the pelvis which could be physiologic.  Lobular contour to the uterus which could relate fibroid change.  Evidence for some splenomegaly.     From chart review, she was seen by Dr. Jefe Del Real on 2022 for post hospital follow-up.  She reported epigastric discomfort radiating to the right and through her back, negative for gallstones, CT scan was suspicious for duodenitis versus pancreatitis.  EGD was performed with negative duodenitis.  Biopsies of the duodenum showed no inflammation gastric biopsy with mild chronic  inflammation.  H. pylori was negative.  An MRCP was performed with no gallstones noted.  But then there was concern for possible autoimmune pancreatitis involving the head and uncinate process per the radiologist reading.At that time, her amylase lipase were normal as well as her IgG4. Triglycerides were within normal.  Dr. Del Real discussed the case with Dr. Jara.  Patient is on 2 PPIs as well as Pepcid for refractory GERD and has Daniels's esophagus.  Patient was diagnosed with pancreatitis.      DISCHARGE Follow Up Recommendations for labs and diagnostics:   -- Dr. Jara will send referral to the [facility] for management of autoimmune pancreatitis  -- Send the patient home on pain medicine for possible flare but she will need to follow-up with her primary care doctor for continued management and pain medication  -- Patient also can follow-up with gastroenterologist and we will  -- Educated on risk factors will require to return emergency department      Day of Discharge     Vital Signs:  Temp:  [97.1 °F (36.2 °C)-97.5 °F (36.4 °C)] 97.2 °F (36.2 °C)  Heart Rate:  [77-88] 82  Resp:  [16] 16  BP: (109-139)/(57-84) 133/77  Physical Exam:               Constitutional: Awake, alert, no acute distress              Eyes: Pupils equal, sclerae anicteric, no conjunctival injection              HENT: NCAT, mucous membranes moist              Neck: Supple, no thyromegaly, no lymphadenopathy, trachea midline              Respiratory: Clear to auscultation bilaterally, nonlabored respirations               Cardiovascular: RRR, no murmurs, rubs, or gallops, palpable pedal pulses bilaterally              Gastrointestinal: Upper left quadrant quadrant pain.  No rebounding.  No guarding              Musculoskeletal: No bilateral ankle edema, no clubbing or cyanosis to extremities              Psychiatric: Appropriate affect, cooperative              Neurologic: Oriented x 3, strength symmetric in all extremities,  Cranial Nerves grossly intact to confrontation, speech clear              Skin: No rashes       Discharge Details        Discharge Medications      New Medications      Instructions Start Date   HYDROcodone-acetaminophen 5-325 MG per tablet  Commonly known as: NORCO   1 tablet, Oral, Every 4 Hours PRN, For autoimmune pancreatitis         Continue These Medications      Instructions Start Date   atenolol 25 MG tablet  Commonly known as: TENORMIN   25 mg, Oral, Daily      Biotin 5 MG tablet   Oral      BOTOX IM   Intramuscular      clonazePAM 1 MG tablet  Commonly known as: KlonoPIN   1 mg, Oral, 2 Times Daily      Desvenlafaxine  MG tablet sustained-release 24 hour   3 tablets, Oral, Daily      doxepin 100 MG capsule  Commonly known as: SINEquan   100 mg, Oral, Every Night at Bedtime      fexofenadine 180 MG tablet  Commonly known as: ALLEGRA   180 mg, Oral, Daily      levothyroxine 25 MCG tablet  Commonly known as: SYNTHROID, LEVOTHROID   25 mcg, Oral, Daily      lisinopril 10 MG tablet  Commonly known as: PRINIVIL,ZESTRIL   10 mg, Oral, Nightly      Lurasidone HCl 20 MG tablet tablet  Commonly known as: LATUDA   20 mg, Oral, Daily      naratriptan 2.5 MG tablet  Commonly known as: AMERGE   2.5 mg, Oral, As Needed      Nurtec 75 MG tablet dispersible tablet  Generic drug: Rimegepant Sulfate   75 mg, Oral, As Needed      omeprazole 20 MG capsule  Commonly known as: priLOSEC   20 mg, Oral, Nightly      Oyster Shell Calcium/D 500-5 MG-MCG tablet   1 tablet, Oral, Daily      pantoprazole 20 MG EC tablet  Commonly known as: PROTONIX   20 mg, Oral, Daily      prazosin 1 MG capsule  Commonly known as: MINIPRESS   1-2 mg, Oral, Nightly      rOPINIRole 0.25 MG tablet  Commonly known as: REQUIP   0.5 mg, Oral, Nightly, Take 1 hour before bedtime, take 2 tabs nightly       vitamin B-12 100 MCG tablet  Commonly known as: CYANOCOBALAMIN   50 mcg, Oral, Daily             Allergies   Allergen Reactions   • Penicillins    •  Iodides Hives       Discharge Disposition:  Home or Self Care    Diet:  Hospital:  Diet Order   Procedures   • Diet: Liquid Diets; Clear Liquid; Texture: Regular Texture (IDDSI 7); Fluid Consistency: Thin (IDDSI 0)       Discharge Activity:   As tolerated     Condition: Improved    CODE STATUS:  Code Status and Medical Interventions:   Ordered at: 12/29/22 2216     Level Of Support Discussed With:    Patient     Code Status (Patient has no pulse and is not breathing):    CPR (Attempt to Resuscitate)     Medical Interventions (Patient has pulse or is breathing):    Full Support         Future Appointments   Date Time Provider Department Center   1/10/2023  3:45 PM Radha Jara MD [facility]   4/20/2023 10:45 AM Desi Khoury APRN [facility]           Pertinent  and/or Most Recent Results     PROCEDURES:       LAB RESULTS:      Lab 12/30/22  0431 12/29/22  1727   WBC 7.15 6.78   HEMOGLOBIN 10.7* 11.7*   HEMATOCRIT 33.8* 37.1   PLATELETS 214 251   NEUTROS ABS 5.09 4.53   IMMATURE GRANS (ABS) 0.06* 0.05   LYMPHS ABS 1.50 1.66   MONOS ABS 0.49 0.52   EOS ABS 0.00 0.01   MCV 90.6 90.3   LACTATE  --  1.3         Lab 12/30/22  0431 12/29/22  1727   SODIUM 134* 138   POTASSIUM 5.0 4.1   CHLORIDE 104 104   CO2 22.0 23.7   ANION GAP 8.0 10.3   BUN 9 11   CREATININE 0.79 0.79   EGFR 91.3 91.3   GLUCOSE 120* 88   CALCIUM 8.5* 9.7   MAGNESIUM 1.7  --    PHOSPHORUS 3.2  --          Lab 12/29/22  1727   TOTAL PROTEIN 7.1   ALBUMIN 4.3   GLOBULIN 2.8   ALT (SGPT) 18   AST (SGOT) 14   BILIRUBIN 0.2   ALK PHOS 92   LIPASE 33                     Brief Urine Lab Results  (Last result in the past 365 days)      Color   Clarity   Blood   Leuk Est   Nitrite   Protein   CREAT   Urine HCG        12/29/22 1739 Yellow   Clear   Negative   Negative   Negative   Negative               Microbiology Results (last 10 days)     ** No results found for the last 240 hours. **          CT Abdomen Pelvis Without  Contrast    Result Date: 12/19/2022  Impression: Findings most consistent with acute pancreatitis. Less likely is duodenitis. Left adrenal nodule. In the absence of known malignancy, this is likely an adenoma. Images reviewed, interpreted, and dictated by Felicity Farnsworth MD    CT Abdomen Pelvis With Contrast    Result Date: 12/29/2022  Impression:   1. There is some stranding in the fat around the head of the pancreas that could relate to some early pancreatitis. 2. Hepatic steatosis. 3. Suggested involuting cyst left ovary.  There is some free fluid within the pelvis which could be physiologic. 4. Lobular contour to the uterus which could relate to fibroid change. 5. Evidence for some splenomegaly.     EFRA THOMAS MD       Electronically Signed and Approved By: EFRA THOMAS MD on 12/29/2022 at 20:40             MR abdomen without IV contrast MRCP    Result Date: 12/20/2022  Impression: Enlarged and edematous proximal pancreas involving the pancreatic head, neck, and uncinate process with mild peripancreatic inflammation, which appears to be involving the adjacent duodenum and extending to the bilateral anterior pararenal spaces. These constellation of findings are compatible with mild acute interstitial edematous pancreatitis or focal autoimmune pancreatitis. No pancreatic ductal dilatation. No discrete pancreatic lesions identified on this noncontrast exam. No peripancreatic fluid collections. Recommend posttreatment follow-up imaging to rule out occult pathology. Normal gallbladder. No biliary ductal dilatation. No cholelithiasis. No choledocholithiasis. Trace amount of bilateral pleural effusions. Mild dependent body wall edema.                     Labs Pending at Discharge:  Pending Labs     Order Current Status    IgG 4 In process            Time spent on Discharge including face to face service:  > 30  minutes    Electronically signed by Bernardino Badillo MD, 12/30/22, 6:21 PM EST.

## 2022-12-30 NOTE — PLAN OF CARE
Goal Outcome Evaluation:      Pt is alert and oriented. She was able to have a shower per drs orders. C/O pain in abd and medicated per MAR. No other complaints given the rest of the shift. Will continue with care plan and monitoring.

## 2022-12-30 NOTE — H&P
[facility]  [facility]: 20[facility]   Patient Name: Silvia Guillory  : 1970  MRN: [id number]  Primary Care Physician:  Daniel Monaco, RACHAEL  Date of admission: 2022    Subjective Abdominal pain  Subjective     Chief Complaint: Abdominal pain    HPI: Patient is a 52-year-old female who presents to the emergency room with epigastric abdominal pain.  She was diagnosed with pancreatitis last week.  She was instructed by Dr. Jara to come to the hospital if the pain got worse.  She admits worsening abdominal pain.  She denies any nausea and vomiting.    On arrival to the ED, patient is a pressure is 97.9, pulse is 94, respiratory rate is 18, blood pressure is 141/83, patient is 100% on room air.  CT shows stranding of fat around the head of the pancreas likely related to early pancreatitis.  Hepatic steatosis.  Suggested involuting cyst of the left ovary.  There is some free fluid in the pelvis which could be physiologic.  Lobular contour to the uterus which could relate fibroid change.  Evidence for some splenomegaly.    From chart review, she was seen by Dr. Jefe Del Real on 2022 for post hospital follow-up.  She reported epigastric discomfort radiating to the right and through her back, negative for gallstones, CT scan was suspicious for duodenitis versus pancreatitis.  EGD was performed with negative duodenitis.  Biopsies of the duodenum showed no inflammation gastric biopsy with mild chronic inflammation.  H. pylori was negative.  An MRCP was performed with no gallstones noted.  But then there was concern for possible autoimmune pancreatitis involving the head and uncinate process per the radiologist reading.At that time, her amylase lipase were normal as well as her IgG4. Triglycerides were within normal.  Dr. Del Real discussed the case with Dr. Jara.  Patient is on 2 PPIs as well as Pepcid for refractory GERD and has Daniels's esophagus.  Patient was  diagnosed with pancreatitis.    Personal History     Past Medical History:  Past Medical History:   Diagnosis Date   • Depression    • Hypertension    • Migraine    • Restless leg          Past Surgical History:  Past Surgical History:   Procedure Laterality Date   • ADENOIDECTOMY     •  SECTION     • ENDOSCOPY N/A 2022    Procedure: ESOPHAGOGASTRODUODENOSCOPY WITH BIOPSIES;  Surgeon: Radha Jara MD;  Location: [facility] ENDOSCOPY;  Service: Gastroenterology;  Laterality: N/A;  HIATAL HERNIA, GASTRITIS, GASTRIC POLYP   • INNER EAR SURGERY     • POSTPARTUM TUBAL LIGATION  1998   • TONSILLECTOMY         Family History:   Breast Cancer-related family history is not on file.      Social History:   Social History     Socioeconomic History   • Marital status:      Spouse name: terry   Tobacco Use   • Smoking status: Never   • Smokeless tobacco: Never   Vaping Use   • Vaping Use: Never used   Substance and Sexual Activity   • Alcohol use: No   • Drug use: No   • Sexual activity: Defer         Home Medications:  Biotin, Desvenlafaxine ER, Lurasidone HCl, OnabotulinumtoxinA, Oyster Shell Calcium/D, Rimegepant Sulfate, atenolol, clonazePAM, doxepin, fexofenadine, levothyroxine, lisinopril, naratriptan, omeprazole, pantoprazole, prazosin, rOPINIRole, and vitamin B-12    Allergies:  Allergies   Allergen Reactions   • Penicillins    • Iodides Hives       Review of Systems   All systems were reviewed and negative except for: Abdominal pain    Objective   Objective     Vitals:   Temp:  [97.9 °F (36.6 °C)] 97.9 °F (36.6 °C)  Heart Rate:  [77-94] 77  Resp:  [16-18] 16  BP: (109-141)/(63-84) 119/77    Physical Exam    Constitutional: Awake, alert, no acute distress   Eyes: Pupils equal, sclerae anicteric, no conjunctival injection   HENT: NCAT, mucous membranes moist   Neck: Supple, no thyromegaly, no lymphadenopathy, trachea midline   Respiratory: Clear to auscultation bilaterally, nonlabored  respirations    Cardiovascular: RRR, no murmurs, rubs, or gallops, palpable pedal pulses bilaterally   Gastrointestinal: Positive bowel sounds, soft, nontender, nondistended   Musculoskeletal: No bilateral ankle edema, no clubbing or cyanosis to extremities   Psychiatric: Appropriate affect, cooperative   Neurologic: Oriented x 3, strength symmetric in all extremities, Cranial Nerves grossly intact to confrontation, speech clear   Skin: No rashes     Result Review    Result Review:  I have personally reviewed the results from the time of this admission to 12/29/2022 22:56 EST and agree with these findings:  [x]  Laboratory  []  Microbiology  [x]  Radiology  []  EKG/Telemetry   []  Cardiology/Vascular   []  Pathology  [x]  Old records  []  Other:      Assessment & Plan   Assessment / Plan   #1 possible early pancreatitis on CT  -Patient does not drink alcohol or have high triglycerides  -Lipase levels not elevated.  -There was concern for autoimmune pancreatitis based upon prior abdominal CT.  However, patient's amylase and lipase and IgG4 were normal recently.  I will check IgG4 again.  -Aggressive hydration, pain control.  -GI consulted.  Patient follows with Dr. Jara normally.    #2 hypertension continue atenolol, lisinopril    #3 allergic rhinitis continue Zyrtec    #4 GERD continue Pepcid Protonix    #5 hypothyroidism continue    #6 depression many of patient's meds are not on formulary.  She will have to provide them.  Patient is a complex psychiatric history.    #7 restless leg syndrome continue Requip    #8 history of Daniels's esophagus      DVT prophylaxis:  Medical DVT prophylaxis orders are present.    CODE STATUS:    Level Of Support Discussed With: Patient  Code Status (Patient has no pulse and is not breathing): CPR (Attempt to Resuscitate)  Medical Interventions (Patient has pulse or is breathing): Full Support      Admission Status:  I believe this patient meets inpatient  status.    Electronically signed by Tariq Driscoll DO, 12/29/22, 10:56 PM EST.

## 2022-12-30 NOTE — CONSULTS
Chief Complaint  Abdominal Pain    History of Present Illness  Silvia Guillory is a 52 y.o. female history of depression, hypertension, migraine headaches, restless leg syndrome who presents to [facility] 4TH FLOOR MEDICAL TELEMETRY UNIT on referral from No ref. provider found for a gastroenterology evaluation of abdominal pain.  This pain is epigastric.  Radiates to the back.  Aching in nature .  8-9 over 10 intensity no breast pain relieving factor has been constant.  She has been diagnosed with autoimmune pancreatitis by her surgeon Dr. Del Real in [place].  She is supposed to see Dr. Jara in January.  She was told to come to the ER here which she did yesterday.  She is feeling slightly better with pain medicine and taking clear liquids.      Labs Result Review Imaging    Past Medical History:   Diagnosis Date   • Depression    • Hypertension    • Migraine    • Restless leg        Past Surgical History:   Procedure Laterality Date   • ADENOIDECTOMY     •  SECTION     • ENDOSCOPY N/A 2022    Procedure: ESOPHAGOGASTRODUODENOSCOPY WITH BIOPSIES;  Surgeon: Radha Jara MD;  Location: [facility] ENDOSCOPY;  Service: Gastroenterology;  Laterality: N/A;  HIATAL HERNIA, GASTRITIS, GASTRIC POLYP   • INNER EAR SURGERY     • POSTPARTUM TUBAL LIGATION  1998   • TONSILLECTOMY           Current Facility-Administered Medications:   •  !Patient Home Medications Stored in Pharmacy, , Does not apply, BID, Driscoll, Dimpi, DO, Given at 22 0829  •  acetaminophen (TYLENOL) tablet 650 mg, 650 mg, Oral, Q4H PRN **OR** acetaminophen (TYLENOL) 160 MG/5ML solution 650 mg, 650 mg, Oral, Q4H PRN **OR** acetaminophen (TYLENOL) suppository 650 mg, 650 mg, Rectal, Q4H PRN, Driscoll, Dimpi, DO  •  acetaminophen (TYLENOL) tablet 1,000 mg, 1,000 mg, Oral, TID, Driscoll, Dimpi, DO, 1,000 mg at 22 1543  •  atenolol (TENORMIN) tablet 25 mg, 25 mg, Oral, Daily, Driscoll, Dimpi, DO, 25 mg at 22 0918  •   sennosides-docusate (PERICOLACE) 8.6-50 MG per tablet 2 tablet, 2 tablet, Oral, BID, 2 tablet at 12/30/22 0825 **AND** polyethylene glycol (MIRALAX) packet 17 g, 17 g, Oral, Daily PRN **AND** bisacodyl (DULCOLAX) EC tablet 5 mg, 5 mg, Oral, Daily PRN **AND** bisacodyl (DULCOLAX) suppository 10 mg, 10 mg, Rectal, Daily PRN, Driscoll, Dimpi, DO  •  cetirizine (zyrTEC) tablet 10 mg, 10 mg, Oral, Daily, Driscoll, Dimpi, DO, 10 mg at 12/30/22 0825  •  clonazePAM (KlonoPIN) tablet 0.5 mg, 0.5 mg, Oral, BID PRN, Driscoll, Dimpi, DO  •  clonazePAM (KlonoPIN) tablet 1 mg, 1 mg, Oral, BID, Driscoll, Dimpi, DO, 1 mg at 12/30/22 0824  •  doxepin (SINEquan) capsule 100 mg, 100 mg, Oral, Nightly, Driscoll, Dimpi, DO  •  doxepin (SINEquan) capsule 25 mg, 25 mg, Oral, Nightly PRN, Driscoll, Dimpi, DO  •  Enoxaparin Sodium (LOVENOX) syringe 40 mg, 40 mg, Subcutaneous, Daily, Driscoll, Dimpi, DO, 40 mg at 12/30/22 0137  •  famotidine (PEPCID) tablet 40 mg, 40 mg, Oral, Daily, Driscoll, Dimpi, DO, 40 mg at 12/30/22 0825  •  HYDROcodone-acetaminophen (NORCO) 5-325 MG per tablet 1 tablet, 1 tablet, Oral, Q4H PRN, Driscoll, Dimpi, DO  •  HYDROcodone-acetaminophen (NORCO) 7.5-325 MG per tablet 2 tablet, 2 tablet, Oral, Q4H PRN, Driscoll, Dimpi, DO  •  HYDROmorphone (DILAUDID) injection 0.5 mg, 0.5 mg, Intravenous, Q2H PRN, 0.5 mg at 12/30/22 1300 **AND** naloxone (NARCAN) injection 0.4 mg, 0.4 mg, Intravenous, Q5 Min PRN, Driscoll, Dimpi, DO  •  ketorolac (TORADOL) injection 30 mg, 30 mg, Intravenous, Q6H PRN, Driscoll, Dimpi, DO  •  levothyroxine (SYNTHROID, LEVOTHROID) tablet 25 mcg, 25 mcg, Oral, Daily, Driscoll, Dimpi, DO, 25 mcg at 12/30/22 0918  •  lisinopril (PRINIVIL,ZESTRIL) tablet 10 mg, 10 mg, Oral, Nightly, Driscoll, Dimpi, DO  •  melatonin tablet 5 mg, 5 mg, Oral, Nightly PRN, Driscoll, Dimpi, DO  •  nitroglycerin (NITROSTAT) SL tablet 0.4 mg, 0.4 mg, Sublingual, Q5 Min PRN, Driscoll, Dimpi, DO  •  ondansetron (ZOFRAN) tablet 4 mg, 4 mg, Oral, Q6H PRN **OR** ondansetron  "(ZOFRAN) injection 4 mg, 4 mg, Intravenous, Q6H PRN, Driscoll, Dimpi, DO, 4 mg at 12/30/22 0136  •  pantoprazole (PROTONIX) injection 40 mg, 40 mg, Intravenous, Q AM, Driscoll, Dimpi, DO, 40 mg at 12/30/22 0136  •  rOPINIRole (REQUIP) tablet 0.5 mg, 0.5 mg, Oral, Nightly, Driscoll, Dimpi, DO, 0.5 mg at 12/30/22 0205  •  sodium chloride 0.9 % flush 10 mL, 10 mL, Intravenous, PRN, Driscoll, Dimpi, DO  •  sodium chloride 0.9 % flush 10 mL, 10 mL, Intravenous, Q12H, Driscoll, Dimpi, DO, 10 mL at 12/30/22 0823  •  sodium chloride 0.9 % infusion 40 mL, 40 mL, Intravenous, PRN, Driscoll, Dimpi, DO  •  sodium chloride 0.9 % infusion, 125 mL/hr, Intravenous, Continuous, Driscoll, Dimpi, DO, Last Rate: 125 mL/hr at 12/30/22 0912, 125 mL/hr at 12/30/22 0912  •  terazosin (HYTRIN) capsule 1 mg, 1 mg, Oral, Nightly, Driscoll, Dimpi, DO, 1 mg at 12/30/22 0206     Allergies   Allergen Reactions   • Penicillins    • Iodides Hives       Family History   Problem Relation Age of Onset   • Heart disease Mother    • Cancer Father         prostate colon    • COPD Father    • Thyroid disease Neg Hx         Social History     Social History Narrative   • Not on file   Social history neck smoking, alcohol use, drug    Immunization:  Immunization History   Administered Date(s) Administered   • COVID-19 (PFIZER) BIVALENT BOOSTER 12+YRS 10/19/2022   • COVID-19 (PFIZER) PURPLE CAP 03/12/2021, 04/06/2021, 11/27/2021   • Hepatitis A 01/19/2017   • Tdap 01/19/2017   • Typhoid, Unspecified 01/19/2017        Objective     Review of Systems   10 system review is negative except was mentioned HPI  Vital Signs:   /77 (BP Location: Right arm, Patient Position: Sitting)   Pulse 82   Temp 97.2 °F (36.2 °C) (Oral)   Resp 16   Ht 160 cm (63\")   Wt 104 kg (230 lb 6.1 oz)   SpO2 100%   BMI 40.81 kg/m²       Physical Exam  Constitutional:       General: She is awake. She is not in acute distress.     Appearance: Normal appearance. She is well-developed and well-groomed. "   HENT:      Head: Normocephalic and atraumatic.      Mouth/Throat:      Mouth: Mucous membranes are moist.      Comments: Meg dental hygiene is good  Eyes:      General: Lids are normal.      Conjunctiva/sclera: Conjunctivae normal.      Pupils: Pupils are equal, round, and reactive to light.   Neck:      Thyroid: No thyroid mass.      Trachea: Trachea normal.   Cardiovascular:      Rate and Rhythm: Normal rate and regular rhythm.      Heart sounds: Normal heart sounds.   Pulmonary:      Effort: Pulmonary effort is normal.      Breath sounds: Normal breath sounds and air entry.   Abdominal:      General: Abdomen is flat. Bowel sounds are normal. There is no distension.      Palpations: Abdomen is soft. There is no mass.      Tenderness: There is no abdominal tenderness. There is no guarding.   Musculoskeletal:      Cervical back: Neck supple.      Right lower leg: No edema.      Left lower leg: No edema.   Skin:     General: Skin is warm and moist.      Coloration: Skin is not cyanotic, jaundiced or pale.      Findings: No rash.      Nails: There is no clubbing.   Neurological:      Mental Status: She is alert and oriented to person, place, and time.   Psychiatric:         Attention and Perception: Attention normal.         Mood and Affect: Mood and affect normal.         Speech: Speech normal.         Labs:  Results from last 7 days   Lab Units 12/30/22  0431 12/29/22  1727   WBC 10*3/mm3 7.15 6.78   HEMOGLOBIN g/dL 10.7* 11.7*   HEMATOCRIT % 33.8* 37.1   PLATELETS 10*3/mm3 214 251      Results from last 7 days   Lab Units 12/30/22  0431 12/29/22  1727   SODIUM mmol/L 134* 138   POTASSIUM mmol/L 5.0 4.1   CHLORIDE mmol/L 104 104   CO2 mmol/L 22.0 23.7   BUN mg/dL 9 11   CREATININE mg/dL 0.79 0.79   CALCIUM mg/dL 8.5* 9.7   BILIRUBIN mg/dL  --  0.2   ALK PHOS U/L  --  92   ALT (SGPT) U/L  --  18   AST (SGOT) U/L  --  14   GLUCOSE mg/dL 120* 88             Assessment & Plan:  Principal Problem:    Pancreatitis      Idiopathic acute pancreatitis.  Etiology unclear.  Severity is mild    Plan IV fluids and pain control.  Patient will need an appointment to tertiary care center as an outpatient.    Patient was given instructions and counseling regarding her condition or for health maintenance advice. Please see specific information pulled into the AVS if appropriate.        Signed:  Vineet Qureshi MD  12/30/22  16:19 EST

## 2022-12-30 NOTE — PROGRESS NOTES
[facility]   Hospitalist Progress Note  Date: 2022  Patient Name: Silvia Guillory  : 1970  MRN: [id number]  Date of admission: 2022      Subjective   Subjective     Chief Complaint: Abdominal pain    Summary:   Patient is a 52-year-old female who presents to the emergency room with epigastric abdominal pain.  She was diagnosed with pancreatitis last week.  She was instructed by Dr. Jara to come to the hospital if the pain got worse.  She admits worsening abdominal pain.  She denies any nausea and vomiting From chart review, she was seen by Dr. Jefe Del Real on 2022 for post hospital follow-up.  She reported epigastric discomfort radiating to the right and through her back, negative for gallstones, CT scan was suspicious for duodenitis versus pancreatitis.  EGD was performed with negative duodenitis.  Biopsies of the duodenum showed no inflammation gastric biopsy with mild chronic inflammation.  H. pylori was negative.  An MRCP was performed with no gallstones noted.  But then there was concern for possible autoimmune pancreatitis involving the head and uncinate process per the radiologist reading.At that time, her amylase lipase were normal as well as her IgG4. Triglycerides were within normal.  Dr. Del Real discussed the case with Dr. Jara.  Patient is on 2 PPIs as well as Pepcid for refractory GERD and has Daniels's esophagus.  Patient was diagnosed with pancreatitis.    Interval Followup:   -- Patient continues to have abdominal pain  -- Not terribly painful right now but does not have an appetite  -- Will wait on gastroenterology see the patient for their input on next steps    Review of Systems   All systems were reviewed and negative    Objective   Objective     Vitals:   Temp:  [97.1 °F (36.2 °C)-97.9 °F (36.6 °C)] 97.1 °F (36.2 °C)  Heart Rate:  [77-94] 79  Resp:  [16-18] 16  BP: (109-141)/(57-84) 112/57  Physical Exam    Constitutional: Awake, alert, no acute  distress   Eyes: Pupils equal, sclerae anicteric, no conjunctival injection   HENT: NCAT, mucous membranes moist   Neck: Supple, no thyromegaly, no lymphadenopathy, trachea midline   Respiratory: Clear to auscultation bilaterally, nonlabored respirations    Cardiovascular: RRR, no murmurs, rubs, or gallops, palpable pedal pulses bilaterally   Gastrointestinal: Upper left quadrant quadrant pain.  No rebounding.  No guarding   Musculoskeletal: No bilateral ankle edema, no clubbing or cyanosis to extremities   Psychiatric: Appropriate affect, cooperative   Neurologic: Oriented x 3, strength symmetric in all extremities, Cranial Nerves grossly intact to confrontation, speech clear   Skin: No rashes     Result Review    Result Review:  I have personally reviewed the results from the time of this admission to 12/30/2022 13:35 EST and agree with these findings:  Sodium is 134  Hemoglobin 10.7    Assessment & Plan   Assessment / Plan     Assessment/Plan:  #1 possible early pancreatitis on CT  -Patient does not drink alcohol or have high triglycerides  -Lipase levels not elevated.-There was concern for autoimmune pancreatitis based upon prior abdominal CT.  However, patient's amylase and lipase and IgG4 were normal recently.  I will check IgG4 again.  -Aggressive hydration, pain control.  -GI consulted.  Patient follows with Dr. Jara normally.    #2 hypertension continue atenolol, lisinopril    #3 allergic rhinitis continue Zyrtec    #4 GERD continue Pepcid Protonix    #5 hypothyroidism continue    #6 depression many of patient's meds are not on formulary.  She will have to provide them.  Patient is a complex psychiatric history    #7 restless leg syndrome continue Requip        Discussed plan with RN.    CODE STATUS:   Level Of Support Discussed With: Patient  Code Status (Patient has no pulse and is not breathing): CPR (Attempt to Resuscitate)  Medical Interventions (Patient has pulse or is breathing): Full  Support        Electronically signed by Bernardino Badillo MD, 12/30/22, 1:35 PM EST.

## 2022-12-31 ENCOUNTER — READMISSION MANAGEMENT (OUTPATIENT)
Dept: CALL CENTER | Facility: HOSPITAL | Age: 52
End: 2022-12-31

## 2022-12-31 ENCOUNTER — TELEPHONE (OUTPATIENT)
Dept: GASTROENTEROLOGY | Facility: CLINIC | Age: 52
End: 2022-12-31

## 2022-12-31 NOTE — TELEPHONE ENCOUNTER
I see where patient has 2 appointments with our office (1/10 & 4/20).  Dr. Qureshi discussed patient's history with her and recommended that she be seen at  and not here for her suspected autoimmune pancreatitis.  I sent previous phone msg regarding referral.  OK to cancel appointments with our office but would be sure patient is aware.  She will want update on status of her referral.  Thanks

## 2022-12-31 NOTE — OUTREACH NOTE
Prep Survey    Flowsheet Row Responses   [facility] facility patient discharged from? [facility]   Is LACE score < 7 ? Yes   Eligibility Not Eligible   What are the reasons patient is not eligible? Other   Does the patient have one of the following disease processes/diagnoses(primary or secondary)? Other   Prep survey completed? Yes          RIKA ROBLES - Registered Nurse

## 2023-01-03 ENCOUNTER — TELEPHONE (OUTPATIENT)
Dept: GASTROENTEROLOGY | Facility: CLINIC | Age: 53
End: 2023-01-03

## 2023-01-03 LAB — IGG4 SER-MCNC: 26 MG/DL (ref 2–96)

## 2023-01-03 NOTE — TELEPHONE ENCOUNTER
Caller: Silvia Guillory    Relationship to patient: Self    Best call back number: [phone]    New or established patient?  [] New  [x] Established    Date of discharge: 12/29/22     Facility discharged from:  [facility]    Diagnosis/Symptoms: AUTO IMMUNE PACREATITUS     Length of stay (If applicable): 2-3 DAYS    Specialty Only: Did you see a [facility] provider?    [x] Yes  [] No  If so, who? SAW DR. THACKER IN [facility] WHO REFERRED HER TO SPEAK WITH DR. GALINDO OR HER NURSE.      THEY TOLD HER TO CALL AND TALK TO DR. GALINDO'S NURSE ABOUT HER REFERRAL, AND ABOUT GOING TO [facility] BECAUSE THEY SPECIALIZE IN DX. WOULD LIKE TO HEAR BACK TODAY 01/03/23. MAY CALL ANYTIME.

## (undated) DEVICE — Device: Brand: DEFENDO AIR/WATER/SUCTION AND BIOPSY VALVE

## (undated) DEVICE — SINGLE-USE BIOPSY FORCEPS: Brand: RADIAL JAW 4

## (undated) DEVICE — EGD OR ERCP KIT: Brand: MEDLINE INDUSTRIES, INC.

## (undated) DEVICE — SOL IRRG H2O PL/BG 1000ML STRL